# Patient Record
Sex: MALE | Race: WHITE | NOT HISPANIC OR LATINO | Employment: FULL TIME | URBAN - METROPOLITAN AREA
[De-identification: names, ages, dates, MRNs, and addresses within clinical notes are randomized per-mention and may not be internally consistent; named-entity substitution may affect disease eponyms.]

---

## 2017-10-09 ENCOUNTER — TELEPHONE (OUTPATIENT)
Dept: INTERNAL MEDICINE | Facility: CLINIC | Age: 27
End: 2017-10-09

## 2017-10-09 NOTE — TELEPHONE ENCOUNTER
Spoke to patient, offered appointment for Wednesday (10/11/17). Patient states he will call back to schedule once he speaks to his spouse.

## 2017-10-09 NOTE — TELEPHONE ENCOUNTER
----- Message from Renetta Stevenson sent at 10/9/2017  8:32 AM CDT -----  Contact: self  Nathan Huff  MRN: [id number]  : 1990  PCP: Shanelle Pan  Home Phone      [phone]  Work Phone      Not on file.  Mobile          Not on file.      MESSAGE:    Use to see trina needs to change pcp, has been having some left arm/leg numbing and its not going away would like to speak to nurse    Phone:  [phone]

## 2020-08-17 ENCOUNTER — TELEPHONE (OUTPATIENT)
Dept: FAMILY MEDICINE | Facility: CLINIC | Age: 30
End: 2020-08-17

## 2020-08-17 NOTE — TELEPHONE ENCOUNTER
----- Message from Yelitza Ortega sent at 2020  8:48 AM CDT -----  Regarding: Est care  Contact: spouse- Tiffani Huff  MRN: [id number]  : 1990  PCP: Shanelle Pan  Home Phone      [phone]  Work Phone      Not on file.  Mobile          Not on file.      MESSAGE:   Wife would like the patient would like to the patient to est care with dr. Thorne, states he has having anxiety and depression.   [employer] insurance please advise    Phone:  [phone]

## 2020-08-17 NOTE — TELEPHONE ENCOUNTER
Spoke with Tiffani--requesting to be seen as a new pt by Dr Thorne or Dr Sharpe. Advised her that neither one is taking new pts at this time, but I could set him up with another provider or I could send a message to AD. She declined and said don't worry about it.

## 2021-05-04 ENCOUNTER — PATIENT MESSAGE (OUTPATIENT)
Dept: RESEARCH | Facility: HOSPITAL | Age: 31
End: 2021-05-04

## 2022-10-25 ENCOUNTER — HOSPITAL ENCOUNTER (EMERGENCY)
Facility: HOSPITAL | Age: 32
Discharge: HOME | End: 2022-10-25
Attending: SURGERY
Payer: COMMERCIAL

## 2022-10-25 VITALS
RESPIRATION RATE: 18 BRPM | HEART RATE: 81 BPM | BODY MASS INDEX: 30.7 KG/M2 | TEMPERATURE: 98 F | HEIGHT: 72 IN | SYSTOLIC BLOOD PRESSURE: 128 MMHG | WEIGHT: 226.63 LBS | OXYGEN SATURATION: 95 % | DIASTOLIC BLOOD PRESSURE: 85 MMHG

## 2022-10-25 DIAGNOSIS — M25.512 ACUTE PAIN OF LEFT SHOULDER: ICD-10-CM
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
## 2022-10-25 PROCEDURE — 99284 EMERGENCY DEPT VISIT MOD MDM: CPT

## 2022-10-25 RX ORDER — CYCLOBENZAPRINE HCL 10 MG
10 TABLET ORAL 3 TIMES DAILY PRN
Qty: 10 TABLET | Refills: 0 | Status: SHIPPED | OUTPATIENT
Start: 2022-10-25 | End: 2022-10-30

## 2022-10-25 RX ORDER — IBUPROFEN 800 MG/1
800 TABLET ORAL EVERY 6 HOURS PRN
Qty: 20 TABLET | Refills: 0 | Status: SHIPPED | OUTPATIENT
Start: 2022-10-25 | End: 2024-02-01

## 2022-10-25 RX ORDER — PANTOPRAZOLE SODIUM 40 MG/1
40 TABLET, DELAYED RELEASE ORAL DAILY
Qty: 30 TABLET | Refills: 0 | Status: SHIPPED | OUTPATIENT
Start: 2022-10-25 | End: 2024-02-01
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
## 2022-11-11 ENCOUNTER — OFFICE VISIT (OUTPATIENT)
Dept: FAMILY MEDICINE | Facility: CLINIC | Age: 32
End: 2022-11-11
Payer: COMMERCIAL

## 2022-11-11 VITALS
HEIGHT: 72 IN | SYSTOLIC BLOOD PRESSURE: 124 MMHG | WEIGHT: 227.19 LBS | RESPIRATION RATE: 14 BRPM | BODY MASS INDEX: 30.77 KG/M2 | OXYGEN SATURATION: 97 % | DIASTOLIC BLOOD PRESSURE: 88 MMHG | HEART RATE: 69 BPM

## 2022-11-11 DIAGNOSIS — G47.00 INSOMNIA, UNSPECIFIED TYPE: ICD-10-CM
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
## 2022-11-11 PROCEDURE — 3074F PR MOST RECENT SYSTOLIC BLOOD PRESSURE < 130 MM HG: ICD-10-PCS | Mod: CPTII,S$GLB,, | Performed by: FAMILY MEDICINE

## 2022-11-11 PROCEDURE — 99999 PR PBB SHADOW E&M-EST. PATIENT-LVL III: CPT | Mod: PBBFAC,,, | Performed by: FAMILY MEDICINE

## 2022-11-11 PROCEDURE — 99203 PR OFFICE/OUTPT VISIT, NEW, LEVL III, 30-44 MIN: ICD-10-PCS | Mod: S$GLB,,, | Performed by: FAMILY MEDICINE

## 2022-11-11 PROCEDURE — 1160F RVW MEDS BY RX/DR IN RCRD: CPT | Mod: CPTII,S$GLB,, | Performed by: FAMILY MEDICINE

## 2022-11-11 PROCEDURE — 3079F DIAST BP 80-89 MM HG: CPT | Mod: CPTII,S$GLB,, | Performed by: FAMILY MEDICINE

## 2022-11-11 PROCEDURE — 3074F SYST BP LT 130 MM HG: CPT | Mod: CPTII,S$GLB,, | Performed by: FAMILY MEDICINE

## 2022-11-11 PROCEDURE — 99999 PR PBB SHADOW E&M-EST. PATIENT-LVL III: ICD-10-PCS | Mod: PBBFAC,,, | Performed by: FAMILY MEDICINE

## 2022-11-11 PROCEDURE — 1159F PR MEDICATION LIST DOCUMENTED IN MEDICAL RECORD: ICD-10-PCS | Mod: CPTII,S$GLB,, | Performed by: FAMILY MEDICINE

## 2022-11-11 PROCEDURE — 3008F PR BODY MASS INDEX (BMI) DOCUMENTED: ICD-10-PCS | Mod: CPTII,S$GLB,, | Performed by: FAMILY MEDICINE

## 2022-11-11 PROCEDURE — 99203 OFFICE O/P NEW LOW 30 MIN: CPT | Mod: S$GLB,,, | Performed by: FAMILY MEDICINE

## 2022-11-11 PROCEDURE — 3079F PR MOST RECENT DIASTOLIC BLOOD PRESSURE 80-89 MM HG: ICD-10-PCS | Mod: CPTII,S$GLB,, | Performed by: FAMILY MEDICINE

## 2022-11-11 PROCEDURE — 1159F MED LIST DOCD IN RCRD: CPT | Mod: CPTII,S$GLB,, | Performed by: FAMILY MEDICINE

## 2022-11-11 PROCEDURE — 3008F BODY MASS INDEX DOCD: CPT | Mod: CPTII,S$GLB,, | Performed by: FAMILY MEDICINE

## 2022-11-11 PROCEDURE — 1160F PR REVIEW ALL MEDS BY PRESCRIBER/CLIN PHARMACIST DOCUMENTED: ICD-10-PCS | Mod: CPTII,S$GLB,, | Performed by: FAMILY MEDICINE

## 2022-11-11 RX ORDER — TRAZODONE HYDROCHLORIDE 50 MG/1
50 TABLET ORAL NIGHTLY
Qty: 30 TABLET | Refills: 2 | Status: SHIPPED | OUTPATIENT
Start: 2022-11-11 | End: 2024-02-01
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
## 2022-12-05 ENCOUNTER — LAB VISIT (OUTPATIENT)
Dept: FAMILY MEDICINE | Facility: CLINIC | Age: 32
End: 2022-12-05
Payer: COMMERCIAL

## 2022-12-05 ENCOUNTER — OFFICE VISIT (OUTPATIENT)
Dept: FAMILY MEDICINE | Facility: CLINIC | Age: 32
End: 2022-12-05
Payer: COMMERCIAL

## 2022-12-05 VITALS
HEART RATE: 60 BPM | WEIGHT: 225.44 LBS | HEIGHT: 72 IN | OXYGEN SATURATION: 96 % | RESPIRATION RATE: 14 BRPM | DIASTOLIC BLOOD PRESSURE: 78 MMHG | BODY MASS INDEX: 30.54 KG/M2 | SYSTOLIC BLOOD PRESSURE: 120 MMHG

## 2022-12-05 DIAGNOSIS — G47.00 INSOMNIA, UNSPECIFIED TYPE: ICD-10-CM

## 2022-12-05 DIAGNOSIS — Z00.00 PREVENTATIVE HEALTH CARE: ICD-10-CM

## 2022-12-05 DIAGNOSIS — Z00.00 PREVENTATIVE HEALTH CARE: Primary | ICD-10-CM

## 2022-12-05 LAB
ALBUMIN SERPL BCP-MCNC: 4.4 G/DL (ref 3.5–5.2)
ALP SERPL-CCNC: 82 U/L (ref 55–135)
ALT SERPL W/O P-5'-P-CCNC: 21 U/L (ref 10–44)
ANION GAP SERPL CALC-SCNC: 10 MMOL/L (ref 8–16)
AST SERPL-CCNC: 18 U/L (ref 10–40)
BASOPHILS # BLD AUTO: 0.05 K/UL (ref 0–0.2)
BASOPHILS NFR BLD: 0.8 % (ref 0–1.9)
BILIRUB SERPL-MCNC: 1.9 MG/DL (ref 0.1–1)
BUN SERPL-MCNC: 9 MG/DL (ref 6–20)
CALCIUM SERPL-MCNC: 9.9 MG/DL (ref 8.7–10.5)
CHLORIDE SERPL-SCNC: 105 MMOL/L (ref 95–110)
CHOLEST SERPL-MCNC: 236 MG/DL (ref 120–199)
CHOLEST/HDLC SERPL: 7.4 {RATIO} (ref 2–5)
CO2 SERPL-SCNC: 26 MMOL/L (ref 23–29)
CREAT SERPL-MCNC: 0.8 MG/DL (ref 0.5–1.4)
DIFFERENTIAL METHOD: NORMAL
EOSINOPHIL # BLD AUTO: 0.3 K/UL (ref 0–0.5)
EOSINOPHIL NFR BLD: 4.3 % (ref 0–8)
ERYTHROCYTE [DISTWIDTH] IN BLOOD BY AUTOMATED COUNT: 12.3 % (ref 11.5–14.5)
EST. GFR  (NO RACE VARIABLE): >60 ML/MIN/1.73 M^2
GLUCOSE SERPL-MCNC: 87 MG/DL (ref 70–110)
HCT VFR BLD AUTO: 46.1 % (ref 40–54)
HDLC SERPL-MCNC: 32 MG/DL (ref 40–75)
HDLC SERPL: 13.6 % (ref 20–50)
HGB BLD-MCNC: 15.7 G/DL (ref 14–18)
IMM GRANULOCYTES # BLD AUTO: 0.02 K/UL (ref 0–0.04)
IMM GRANULOCYTES NFR BLD AUTO: 0.3 % (ref 0–0.5)
LDLC SERPL CALC-MCNC: 171.2 MG/DL (ref 63–159)
LYMPHOCYTES # BLD AUTO: 2.6 K/UL (ref 1–4.8)
LYMPHOCYTES NFR BLD: 41.9 % (ref 18–48)
MCH RBC QN AUTO: 30 PG (ref 27–31)
MCHC RBC AUTO-ENTMCNC: 34.1 G/DL (ref 32–36)
MCV RBC AUTO: 88 FL (ref 82–98)
MONOCYTES # BLD AUTO: 0.4 K/UL (ref 0.3–1)
MONOCYTES NFR BLD: 5.8 % (ref 4–15)
NEUTROPHILS # BLD AUTO: 2.9 K/UL (ref 1.8–7.7)
NEUTROPHILS NFR BLD: 46.9 % (ref 38–73)
NONHDLC SERPL-MCNC: 204 MG/DL
NRBC BLD-RTO: 0 /100 WBC
PLATELET # BLD AUTO: 250 K/UL (ref 150–450)
PMV BLD AUTO: 11.3 FL (ref 9.2–12.9)
POTASSIUM SERPL-SCNC: 5 MMOL/L (ref 3.5–5.1)
PROT SERPL-MCNC: 7.4 G/DL (ref 6–8.4)
RBC # BLD AUTO: 5.24 M/UL (ref 4.6–6.2)
SODIUM SERPL-SCNC: 141 MMOL/L (ref 136–145)
TRIGL SERPL-MCNC: 164 MG/DL (ref 30–150)
TSH SERPL DL<=0.005 MIU/L-ACNC: 2.05 UIU/ML (ref 0.4–4)
WBC # BLD AUTO: 6.08 K/UL (ref 3.9–12.7)

## 2022-12-05 PROCEDURE — 99999 PR PBB SHADOW E&M-EST. PATIENT-LVL III: ICD-10-PCS | Mod: PBBFAC,,, | Performed by: FAMILY MEDICINE

## 2022-12-05 PROCEDURE — 3008F PR BODY MASS INDEX (BMI) DOCUMENTED: ICD-10-PCS | Mod: CPTII,S$GLB,, | Performed by: FAMILY MEDICINE

## 2022-12-05 PROCEDURE — 36415 COLL VENOUS BLD VENIPUNCTURE: CPT | Mod: S$GLB,,, | Performed by: FAMILY MEDICINE

## 2022-12-05 PROCEDURE — 85025 COMPLETE CBC W/AUTO DIFF WBC: CPT | Performed by: FAMILY MEDICINE

## 2022-12-05 PROCEDURE — 99395 PR PREVENTIVE VISIT,EST,18-39: ICD-10-PCS | Mod: S$GLB,,, | Performed by: FAMILY MEDICINE

## 2022-12-05 PROCEDURE — 3008F BODY MASS INDEX DOCD: CPT | Mod: CPTII,S$GLB,, | Performed by: FAMILY MEDICINE

## 2022-12-05 PROCEDURE — 83036 HEMOGLOBIN GLYCOSYLATED A1C: CPT | Performed by: FAMILY MEDICINE

## 2022-12-05 PROCEDURE — 80061 LIPID PANEL: CPT | Performed by: FAMILY MEDICINE

## 2022-12-05 PROCEDURE — 36415 PR COLLECTION VENOUS BLOOD,VENIPUNCTURE: ICD-10-PCS | Mod: S$GLB,,, | Performed by: FAMILY MEDICINE

## 2022-12-05 PROCEDURE — 99395 PREV VISIT EST AGE 18-39: CPT | Mod: S$GLB,,, | Performed by: FAMILY MEDICINE

## 2022-12-05 PROCEDURE — 1159F PR MEDICATION LIST DOCUMENTED IN MEDICAL RECORD: ICD-10-PCS | Mod: CPTII,S$GLB,, | Performed by: FAMILY MEDICINE

## 2022-12-05 PROCEDURE — 84443 ASSAY THYROID STIM HORMONE: CPT | Performed by: FAMILY MEDICINE

## 2022-12-05 PROCEDURE — 99999 PR PBB SHADOW E&M-EST. PATIENT-LVL III: CPT | Mod: PBBFAC,,, | Performed by: FAMILY MEDICINE

## 2022-12-05 PROCEDURE — 80053 COMPREHEN METABOLIC PANEL: CPT | Performed by: FAMILY MEDICINE

## 2022-12-05 PROCEDURE — 1159F MED LIST DOCD IN RCRD: CPT | Mod: CPTII,S$GLB,, | Performed by: FAMILY MEDICINE
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
## 2022-12-06 LAB
ESTIMATED AVG GLUCOSE: 97 MG/DL (ref 68–131)
HBA1C MFR BLD: 5 % (ref 4–5.6)

## 2022-12-06 NOTE — PROGRESS NOTES
Test results normal   Benzoyl Peroxide Counseling: Patient counseled that medicine may cause skin irritation and bleach clothing.  In the event of skin irritation, the patient was advised to reduce the amount of the drug applied or use it less frequently.   The patient verbalized understanding of the proper use and possible adverse effects of benzoyl peroxide.  All of the patient's questions and concerns were addressed.

## 2022-12-06 NOTE — PROGRESS NOTES
Labs look good   His cholesterol is elevated, but for his age he it is fine. As he gets older he may end up on cholesterol medication....   For now lay off the red meat, fried foods, cheeses.

## 2023-04-20 ENCOUNTER — HOSPITAL ENCOUNTER (OUTPATIENT)
Dept: RADIOLOGY | Facility: HOSPITAL | Age: 33
Discharge: HOME | End: 2023-04-20
Attending: CHIROPRACTOR
Payer: COMMERCIAL

## 2023-04-20 DIAGNOSIS — M51.16 RADICULOPATHY DUE TO LUMBAR INTERVERTEBRAL DISC DISORDER: ICD-10-CM

## 2023-04-20 PROCEDURE — 72148 MRI LUMBAR SPINE W/O DYE: CPT | Mod: 26,,, | Performed by: RADIOLOGY

## 2023-04-20 PROCEDURE — 72148 MRI LUMBAR SPINE WITHOUT CONTRAST: ICD-10-PCS | Mod: 26,,, | Performed by: RADIOLOGY

## 2023-04-20 PROCEDURE — 72148 MRI LUMBAR SPINE W/O DYE: CPT | Mod: TC

## 2024-02-01 ENCOUNTER — OFFICE VISIT (OUTPATIENT)
Dept: FAMILY MEDICINE | Facility: CLINIC | Age: 34
End: 2024-02-01
Payer: COMMERCIAL

## 2024-02-01 VITALS
OXYGEN SATURATION: 97 % | RESPIRATION RATE: 18 BRPM | BODY MASS INDEX: 31.4 KG/M2 | DIASTOLIC BLOOD PRESSURE: 74 MMHG | HEART RATE: 60 BPM | HEIGHT: 72 IN | SYSTOLIC BLOOD PRESSURE: 118 MMHG | WEIGHT: 231.81 LBS

## 2024-02-01 DIAGNOSIS — Z00.00 PREVENTATIVE HEALTH CARE: Primary | ICD-10-CM

## 2024-02-01 DIAGNOSIS — G43.109 MIGRAINE WITH AURA AND WITHOUT STATUS MIGRAINOSUS, NOT INTRACTABLE: ICD-10-CM

## 2024-02-01 DIAGNOSIS — M51.36 BULGE OF LUMBAR DISC WITHOUT MYELOPATHY: ICD-10-CM

## 2024-02-01 PROBLEM — M51.369 BULGE OF LUMBAR DISC WITHOUT MYELOPATHY: Status: ACTIVE | Noted: 2024-02-01

## 2024-02-01 PROCEDURE — 3008F BODY MASS INDEX DOCD: CPT | Mod: CPTII,S$GLB,, | Performed by: FAMILY MEDICINE

## 2024-02-01 PROCEDURE — 3078F DIAST BP <80 MM HG: CPT | Mod: CPTII,S$GLB,, | Performed by: FAMILY MEDICINE

## 2024-02-01 PROCEDURE — 3074F SYST BP LT 130 MM HG: CPT | Mod: CPTII,S$GLB,, | Performed by: FAMILY MEDICINE

## 2024-02-01 PROCEDURE — 99999 PR PBB SHADOW E&M-EST. PATIENT-LVL III: CPT | Mod: PBBFAC,,, | Performed by: FAMILY MEDICINE

## 2024-02-01 PROCEDURE — 1159F MED LIST DOCD IN RCRD: CPT | Mod: CPTII,S$GLB,, | Performed by: FAMILY MEDICINE

## 2024-02-01 PROCEDURE — 1160F RVW MEDS BY RX/DR IN RCRD: CPT | Mod: CPTII,S$GLB,, | Performed by: FAMILY MEDICINE

## 2024-02-01 PROCEDURE — 99395 PREV VISIT EST AGE 18-39: CPT | Mod: S$GLB,,, | Performed by: FAMILY MEDICINE

## 2024-02-01 NOTE — PROGRESS NOTES
Subjective:       Patient ID: Nathan Huff is a 33 y.o. male.    Chief Complaint: Annual Exam (PT HERE FOR ANNUAL EXAM. )    Pt is a 33 y.o. male who presents for evaluation and management of   Encounter Diagnoses   Name Primary?    Preventative health care Yes    Bulge of lumbar disc without myelopathy     Migraine with aura and without status migrainosus, not intractable    .  Doing well on current meds. Denies any side effects. Prevention is up to date.    Review of Systems   Constitutional:  Negative for activity change and unexpected weight change.   HENT:  Negative for hearing loss, rhinorrhea and trouble swallowing.    Eyes:  Positive for visual disturbance. Negative for discharge.   Respiratory:  Negative for chest tightness and wheezing.    Cardiovascular:  Negative for chest pain and palpitations.   Gastrointestinal:  Negative for blood in stool, constipation, diarrhea and vomiting.   Endocrine: Negative for polydipsia and polyuria.   Genitourinary:  Negative for difficulty urinating, hematuria and urgency.   Musculoskeletal:  Negative for arthralgias, joint swelling and neck pain.   Neurological:  Positive for headaches. Negative for weakness.   Psychiatric/Behavioral:  Negative for confusion and dysphoric mood.        Objective:      Physical Exam  Constitutional:       Appearance: Normal appearance. He is well-developed. He is not ill-appearing.   HENT:      Head: Normocephalic and atraumatic.      Right Ear: External ear normal.      Left Ear: External ear normal.      Nose: Nose normal.      Mouth/Throat:      Mouth: Mucous membranes are moist.      Pharynx: No oropharyngeal exudate or posterior oropharyngeal erythema.   Eyes:      General: No scleral icterus.        Right eye: No discharge.         Left eye: No discharge.      Conjunctiva/sclera: Conjunctivae normal.      Pupils: Pupils are equal, round, and reactive to light.   Neck:      Thyroid: No thyromegaly.      Vascular: No JVD.      Trachea:  No tracheal deviation.   Cardiovascular:      Rate and Rhythm: Normal rate and regular rhythm.      Heart sounds: Normal heart sounds. No murmur heard.  Pulmonary:      Effort: Pulmonary effort is normal. No respiratory distress.      Breath sounds: Normal breath sounds. No wheezing or rales.   Chest:      Chest wall: No tenderness.   Abdominal:      General: Bowel sounds are normal. There is no distension.      Palpations: Abdomen is soft. There is no mass.      Tenderness: There is no abdominal tenderness. There is no guarding or rebound.   Musculoskeletal:         General: Normal range of motion.      Cervical back: Normal range of motion and neck supple.      Right lower leg: No edema.      Left lower leg: No edema.   Lymphadenopathy:      Cervical: No cervical adenopathy.   Skin:     General: Skin is warm and dry.   Neurological:      Mental Status: He is alert and oriented to person, place, and time.      Cranial Nerves: No cranial nerve deficit.      Motor: No abnormal muscle tone.      Coordination: Coordination normal.      Deep Tendon Reflexes: Reflexes are normal and symmetric. Reflexes normal.   Psychiatric:         Behavior: Behavior normal.         Thought Content: Thought content normal.         Judgment: Judgment normal.         Assessment:       1. Preventative health care    2. Bulge of lumbar disc without myelopathy    3. Migraine with aura and without status migrainosus, not intractable        Plan:   1. Preventative health care  -     CBC Auto Differential; Future; Expected date: 02/02/2024  -     Comprehensive Metabolic Panel; Future; Expected date: 02/02/2024  -     Lipid Panel; Future; Expected date: 02/02/2024  -     TSH; Future; Expected date: 02/02/2024    2. Bulge of lumbar disc without myelopathy  Overview:  MRI 2023 with L4/L5/S1 disc bulges. Mild neuro-foraminal narrowing. Pt has no radicular symptoms         3. Migraine with aura and without status migrainosus, not  intractable  Overview:  Had one aura without h/a. A week later he had another aura with h/a following. NO other auras or h/a.   Will observe for now. No meds         No follow-ups on file.

## 2024-02-02 ENCOUNTER — LAB VISIT (OUTPATIENT)
Dept: LAB | Facility: HOSPITAL | Age: 34
End: 2024-02-02
Attending: FAMILY MEDICINE
Payer: COMMERCIAL

## 2024-02-02 DIAGNOSIS — Z00.00 PREVENTATIVE HEALTH CARE: ICD-10-CM

## 2024-02-02 LAB
ALBUMIN SERPL BCP-MCNC: 4.3 G/DL (ref 3.5–5.2)
ALP SERPL-CCNC: 78 U/L (ref 55–135)
ALT SERPL W/O P-5'-P-CCNC: 21 U/L (ref 10–44)
ANION GAP SERPL CALC-SCNC: 8 MMOL/L (ref 8–16)
AST SERPL-CCNC: 17 U/L (ref 10–40)
BASOPHILS # BLD AUTO: 0.05 K/UL (ref 0–0.2)
BASOPHILS NFR BLD: 0.8 % (ref 0–1.9)
BILIRUB SERPL-MCNC: 1 MG/DL (ref 0.1–1)
BUN SERPL-MCNC: 10 MG/DL (ref 6–20)
CALCIUM SERPL-MCNC: 9.6 MG/DL (ref 8.7–10.5)
CHLORIDE SERPL-SCNC: 106 MMOL/L (ref 95–110)
CHOLEST SERPL-MCNC: 217 MG/DL (ref 120–199)
CHOLEST/HDLC SERPL: 6.4 {RATIO} (ref 2–5)
CO2 SERPL-SCNC: 28 MMOL/L (ref 23–29)
CREAT SERPL-MCNC: 0.9 MG/DL (ref 0.5–1.4)
DIFFERENTIAL METHOD BLD: NORMAL
EOSINOPHIL # BLD AUTO: 0.3 K/UL (ref 0–0.5)
EOSINOPHIL NFR BLD: 4.4 % (ref 0–8)
ERYTHROCYTE [DISTWIDTH] IN BLOOD BY AUTOMATED COUNT: 12.2 % (ref 11.5–14.5)
EST. GFR  (NO RACE VARIABLE): >60 ML/MIN/1.73 M^2
GLUCOSE SERPL-MCNC: 97 MG/DL (ref 70–110)
HCT VFR BLD AUTO: 47.1 % (ref 40–54)
HDLC SERPL-MCNC: 34 MG/DL (ref 40–75)
HDLC SERPL: 15.7 % (ref 20–50)
HGB BLD-MCNC: 15.8 G/DL (ref 14–18)
IMM GRANULOCYTES # BLD AUTO: 0.02 K/UL (ref 0–0.04)
IMM GRANULOCYTES NFR BLD AUTO: 0.3 % (ref 0–0.5)
LDLC SERPL CALC-MCNC: 143.6 MG/DL (ref 63–159)
LYMPHOCYTES # BLD AUTO: 2.5 K/UL (ref 1–4.8)
LYMPHOCYTES NFR BLD: 39.1 % (ref 18–48)
MCH RBC QN AUTO: 30.1 PG (ref 27–31)
MCHC RBC AUTO-ENTMCNC: 33.5 G/DL (ref 32–36)
MCV RBC AUTO: 90 FL (ref 82–98)
MONOCYTES # BLD AUTO: 0.4 K/UL (ref 0.3–1)
MONOCYTES NFR BLD: 6 % (ref 4–15)
NEUTROPHILS # BLD AUTO: 3.1 K/UL (ref 1.8–7.7)
NEUTROPHILS NFR BLD: 49.4 % (ref 38–73)
NONHDLC SERPL-MCNC: 183 MG/DL
NRBC BLD-RTO: 0 /100 WBC
PLATELET # BLD AUTO: 247 K/UL (ref 150–450)
PMV BLD AUTO: 10.7 FL (ref 9.2–12.9)
POTASSIUM SERPL-SCNC: 4.5 MMOL/L (ref 3.5–5.1)
PROT SERPL-MCNC: 7.4 G/DL (ref 6–8.4)
RBC # BLD AUTO: 5.25 M/UL (ref 4.6–6.2)
SODIUM SERPL-SCNC: 142 MMOL/L (ref 136–145)
TRIGL SERPL-MCNC: 197 MG/DL (ref 30–150)
TSH SERPL DL<=0.005 MIU/L-ACNC: 1.36 UIU/ML (ref 0.4–4)
WBC # BLD AUTO: 6.35 K/UL (ref 3.9–12.7)

## 2024-02-02 PROCEDURE — 84443 ASSAY THYROID STIM HORMONE: CPT | Performed by: FAMILY MEDICINE

## 2024-02-02 PROCEDURE — 36415 COLL VENOUS BLD VENIPUNCTURE: CPT | Performed by: FAMILY MEDICINE

## 2024-02-02 PROCEDURE — 80053 COMPREHEN METABOLIC PANEL: CPT | Performed by: FAMILY MEDICINE

## 2024-02-02 PROCEDURE — 80061 LIPID PANEL: CPT | Performed by: FAMILY MEDICINE

## 2024-02-02 PROCEDURE — 85025 COMPLETE CBC W/AUTO DIFF WBC: CPT | Performed by: FAMILY MEDICINE

## 2024-02-04 NOTE — PROGRESS NOTES
Labs look good with the exception of a borderline cholesterol. Tell him to eat less red meat. More fish, veggies, and nuts. Nuts will get his good cholesterol up higher where it needs to be. No cholesterol meds are needed yet. Thanks

## 2025-04-07 ENCOUNTER — HOSPITAL ENCOUNTER (EMERGENCY)
Facility: HOSPITAL | Age: 35
Discharge: HOME | End: 2025-04-07
Attending: EMERGENCY MEDICINE
Payer: COMMERCIAL

## 2025-04-07 VITALS
SYSTOLIC BLOOD PRESSURE: 158 MMHG | TEMPERATURE: 99 F | OXYGEN SATURATION: 99 % | HEART RATE: 100 BPM | DIASTOLIC BLOOD PRESSURE: 85 MMHG | RESPIRATION RATE: 18 BRPM | BODY MASS INDEX: 30.48 KG/M2 | WEIGHT: 225 LBS | HEIGHT: 72 IN

## 2025-04-07 DIAGNOSIS — A35 TETANUS: ICD-10-CM

## 2025-04-07 DIAGNOSIS — S68.119A TRAUMATIC AMPUTATION OF FINGER, INITIAL ENCOUNTER: Primary | ICD-10-CM

## 2025-04-07 PROBLEM — S68.113A TRAUMATIC AMPUTATION OF LEFT MIDDLE FINGER: Status: ACTIVE | Noted: 2025-04-07

## 2025-04-07 PROCEDURE — 63600175 PHARM REV CODE 636 W HCPCS: Performed by: EMERGENCY MEDICINE

## 2025-04-07 PROCEDURE — 29125 APPL SHORT ARM SPLINT STATIC: CPT | Mod: LT

## 2025-04-07 PROCEDURE — 99284 EMERGENCY DEPT VISIT MOD MDM: CPT | Mod: 25

## 2025-04-07 PROCEDURE — 25000003 PHARM REV CODE 250

## 2025-04-07 PROCEDURE — 63600175 PHARM REV CODE 636 W HCPCS: Performed by: STUDENT IN AN ORGANIZED HEALTH CARE EDUCATION/TRAINING PROGRAM

## 2025-04-07 PROCEDURE — 90471 IMMUNIZATION ADMIN: CPT | Performed by: EMERGENCY MEDICINE

## 2025-04-07 PROCEDURE — 96374 THER/PROPH/DIAG INJ IV PUSH: CPT

## 2025-04-07 PROCEDURE — 96375 TX/PRO/DX INJ NEW DRUG ADDON: CPT

## 2025-04-07 PROCEDURE — 90715 TDAP VACCINE 7 YRS/> IM: CPT | Performed by: EMERGENCY MEDICINE

## 2025-04-07 RX ORDER — ONDANSETRON HYDROCHLORIDE 2 MG/ML
4 INJECTION, SOLUTION INTRAVENOUS
Status: DISCONTINUED | OUTPATIENT
Start: 2025-04-07 | End: 2025-04-07

## 2025-04-07 RX ORDER — CEFAZOLIN 2 G/1
2 INJECTION, POWDER, FOR SOLUTION INTRAMUSCULAR; INTRAVENOUS ONCE
Status: COMPLETED | OUTPATIENT
Start: 2025-04-07 | End: 2025-04-07

## 2025-04-07 RX ORDER — SULFAMETHOXAZOLE AND TRIMETHOPRIM 800; 160 MG/1; MG/1
1 TABLET ORAL 2 TIMES DAILY
Qty: 14 TABLET | Refills: 0 | Status: SHIPPED | OUTPATIENT
Start: 2025-04-07 | End: 2025-04-14

## 2025-04-07 RX ORDER — CEPHALEXIN 500 MG/1
500 CAPSULE ORAL 3 TIMES DAILY
Qty: 21 CAPSULE | Refills: 0 | Status: SHIPPED | OUTPATIENT
Start: 2025-04-07 | End: 2025-04-07 | Stop reason: ALTCHOICE

## 2025-04-07 RX ORDER — CEFAZOLIN SODIUM 1 G/3ML
2 INJECTION, POWDER, FOR SOLUTION INTRAMUSCULAR; INTRAVENOUS
Status: DISCONTINUED | OUTPATIENT
Start: 2025-04-07 | End: 2025-04-07

## 2025-04-07 RX ORDER — OXYCODONE HYDROCHLORIDE 5 MG/1
5 TABLET ORAL EVERY 4 HOURS PRN
Qty: 12 TABLET | Refills: 0 | Status: SHIPPED | OUTPATIENT
Start: 2025-04-07

## 2025-04-07 RX ORDER — ONDANSETRON HYDROCHLORIDE 2 MG/ML
4 INJECTION, SOLUTION INTRAVENOUS EVERY 6 HOURS PRN
Status: DISCONTINUED | OUTPATIENT
Start: 2025-04-07 | End: 2025-04-07 | Stop reason: HOSPADM

## 2025-04-07 RX ORDER — MORPHINE SULFATE 2 MG/ML
6 INJECTION, SOLUTION INTRAMUSCULAR; INTRAVENOUS
Status: COMPLETED | OUTPATIENT
Start: 2025-04-07 | End: 2025-04-07

## 2025-04-07 RX ORDER — OXYCODONE HYDROCHLORIDE 5 MG/1
5 TABLET ORAL ONCE
Refills: 0 | Status: COMPLETED | OUTPATIENT
Start: 2025-04-07 | End: 2025-04-07

## 2025-04-07 RX ADMIN — CEFAZOLIN 2 G: 2 INJECTION, POWDER, FOR SOLUTION INTRAMUSCULAR; INTRAVENOUS at 06:04

## 2025-04-07 RX ADMIN — ONDANSETRON 4 MG: 2 INJECTION INTRAMUSCULAR; INTRAVENOUS at 07:04

## 2025-04-07 RX ADMIN — CLOSTRIDIUM TETANI TOXOID ANTIGEN (FORMALDEHYDE INACTIVATED), CORYNEBACTERIUM DIPHTHERIAE TOXOID ANTIGEN (FORMALDEHYDE INACTIVATED), BORDETELLA PERTUSSIS TOXOID ANTIGEN (GLUTARALDEHYDE INACTIVATED), BORDETELLA PERTUSSIS FILAMENTOUS HEMAGGLUTININ ANTIGEN (FORMALDEHYDE INACTIVATED), BORDETELLA PERTUSSIS PERTACTIN ANTIGEN, AND BORDETELLA PERTUSSIS FIMBRIAE 2/3 ANTIGEN 0.5 ML: 5; 2; 2.5; 5; 3; 5 INJECTION, SUSPENSION INTRAMUSCULAR at 06:04

## 2025-04-07 RX ADMIN — MORPHINE SULFATE 6 MG: 2 INJECTION, SOLUTION INTRAMUSCULAR; INTRAVENOUS at 06:04

## 2025-04-07 RX ADMIN — OXYCODONE 5 MG: 5 TABLET ORAL at 07:04

## 2025-04-07 NOTE — ED TRIAGE NOTES
Nathan Huff, a 35 y.o. male presents to the ED w/ complaint of left middle finger upper amputation. Pt reports having finger stuck in saw. Arrived to ED with finger piece on ice. Aaox4.

## 2025-04-07 NOTE — ED PROVIDER NOTES
Encounter Date: 4/7/2025       History     Chief Complaint   Patient presents with    Laceration     L middle finger partial amputation, arrived with a portion of finger on ice     HPI    35-year-old male with history of migraine, presenting for left middle finger partial amputation.      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  He looked away and accidentally sliced the tip of his 3rd left finger.  He immediately put compression on, there was initially bleeding but bleeding was controlled with direct pressure.  He reports 10/10 pain in the left hand.  He denies numbness or tingling in the remaining segment.  He denies distal weakness in the fingertip.  He also brought the skin tissue with him to the emergency department that has cut off.  He denies nausea, vomiting, fever, chills, chest pain, shortness of breath.  He denies allergies to medications.    Review of patient's allergies indicates:   Allergen Reactions    Nickel sutures [surgical stainless steel] Rash     Past Medical History:   Diagnosis Date    Psoriasiform dermatitis      Past Surgical History:   Procedure Laterality Date    MOUTH SURGERY       Family History   Problem Relation Name Age of Onset    Heart disease Father      Hyperlipidemia Father      Diabetes Father       Social History[1]  Review of Systems  See HPI for pertinent ROS.   Physical Exam     Initial Vitals [04/07/25 1736]   BP Pulse Resp Temp SpO2   (!) 158/85 100 20 98.5 °F (36.9 °C) 99 %      MAP       --         Physical Exam    Constitutional: He appears well-developed and well-nourished.   HENT:   Head: Normocephalic and atraumatic.   Eyes: Pupils are equal, round, and reactive to light. No scleral icterus.   Neck: No JVD present.   Normal range of motion.  Cardiovascular:  Normal rate and regular rhythm.     Exam reveals no gallop and no friction rub.       No murmur heard.  Pulmonary/Chest: Breath sounds normal. No stridor. He has no  wheezes. He has no rhonchi. He has no rales.   Abdominal: Abdomen is soft. There is no abdominal tenderness. There is no rebound and no guarding.   Musculoskeletal:         General: Tenderness present. No edema.      Cervical back: Normal range of motion.      Comments: Partial amputation to the lateral distal phalynx on the 3rd left digit.  Distal and proximal phalangeal flexion intact.  Sensation intact in the remaining segment, hemodynamically stable.  2+ radial pulse.  No tenderness to palpation of the MCPs, snuffbox, wrist, forearm, elbow.     Neurological: He is alert. GCS score is 15. GCS eye subscore is 4. GCS verbal subscore is 5. GCS motor subscore is 6.   Skin: Skin is warm. Capillary refill takes less than 2 seconds.   Psychiatric: He has a normal mood and affect.                       ED Course   Procedures  Labs Reviewed - No data to display       Imaging Results              X-Ray Hand 3 View Left (Final result)  Result time 04/07/25 19:11:50      Final result by Abdirahman Alvarez MD (04/07/25 19:11:50)                   Impression:      Prominent soft tissue and osseous partial amputation of the distal 3rd finger, as above.      Electronically signed by: Abdirahman Alvarez MD  Date:    04/07/2025  Time:    19:11               Narrative:    EXAMINATION:  XR HAND COMPLETE 3 VIEW LEFT    CLINICAL HISTORY:  finger amputation;.    TECHNIQUE:  PA, lateral, and oblique views of the left hand were performed.    COMPARISON:  None    FINDINGS:  Partial osseous and soft tissue amputation of the 3rd finger tip and distal portion including 3rd distal phalanx distal to the mid shaft and along the radial aspect including the base, radial aspect of the head of the 3rd middle phalanx, and radial aspect of the 3rd DIP.  Small portion of the 3rd distal phalangeal tuft ulnar aspect remains.  Very large soft tissue defect/amputation including the nail bed and fingernail, correlating with the osseous amputation site full-thickness  to the level of the underlying bowel and distal phalanges.    Remaining osseous structures appear well aligned and intact elsewhere.  No radiopaque foreign body identified noting bandage material about the 3rd finger.                                       Medications   ondansetron injection 4 mg (4 mg Intravenous Given 4/7/25 1952)   ceFAZolin 2 g (2 g Intravenous Given 4/7/25 1838)   Tdap vaccine injection 0.5 mL (0.5 mLs Intramuscular Given 4/7/25 1818)   morphine injection 6 mg (6 mg Intravenous Given 4/7/25 1837)   oxyCODONE immediate release tablet 5 mg (5 mg Oral Given 4/7/25 1946)     Medical Decision Making  Risk  Prescription drug management.                                  35-year-old male described as above presenting for finger versus saw with partial finger amputation of the left 3rd distal phalanges.  On arrival, vital signs are stable, he is afebrile and saturating well on room air.  Wound is hemodynamically stable.  He is neurovascularly intact.  Consulted Orthopedics who completed wound washout and placed hand and a volar splint.  He was given IV morphine, IV Ancef, IV Zofran.  Tetanus was also updated and he was also given oral oxycodone for continued pain management.  Ortho recommendations include follow up in clinic with Dr. Chicas, keeping splint clean and dry, and starting oral Bactrim.  Bactrim and p.r.n. oxycodone sent to pharmacy.  He was discharged in stable condition with strict return to ER precautions.      Clinical Impression:  Final diagnoses:  [S68.119A] Traumatic amputation of finger, initial encounter (Primary)  [A35] Tetanus          ED Disposition Condition    Discharge Stable          ED Prescriptions       Medication Sig Dispense Start Date End Date Auth. Provider    cephALEXin (KEFLEX) 500 MG capsule  (Status: Discontinued) Take 1 capsule (500 mg total) by mouth 3 (three) times daily. for 7 days 21 capsule 4/7/2025 4/7/2025 Cheyanne Del Angel MD    oxyCODONE (ROXICODONE) 5 MG  immediate release tablet Take 1 tablet (5 mg total) by mouth every 4 (four) hours as needed for Pain. 12 tablet 4/7/2025 -- Cheyanne Del Angel MD    sulfamethoxazole-trimethoprim 800-160mg (BACTRIM DS) 800-160 mg Tab Take 1 tablet by mouth 2 (two) times daily. for 7 days 14 tablet 4/7/2025 4/14/2025 Cheyanne Del Angel MD          Follow-up Information       Follow up With Specialties Details Why Contact Info    Miguelina Chicas MD Hand Surgery, Orthopedic Surgery Schedule an appointment as soon as possible for a visit   [address]  [facility] [address]  [phone]      [facility] - Emergency Dept Emergency Medicine Go to  If symptoms worsen [address] [address] [phone]                 [1]   Social History  Tobacco Use    Smoking status: Former     Current packs/day: 1.50     Average packs/day: 1.5 packs/day for 2.0 years (3.0 ttl pk-yrs)     Types: Cigarettes     Passive exposure: Past    Smokeless tobacco: Former     Types: Snuff, Chew   Substance Use Topics    Alcohol use: Not Currently     Comment: SOCIAL    Drug use: Never        Cheyanne Del Angel MD  Resident  04/07/25 9844

## 2025-04-07 NOTE — FIRST PROVIDER EVALUATION
Medical screening examination initiated.  I have conducted a focused provider triage encounter, findings are as follows:    Brief history of present illness:  partial finger amputation of left middle finger with shonre saw    There were no vitals filed for this visit.    Pertinent physical exam:  partial amputation    Brief workup plan:  Xray, td, abx, ortho consult    Preliminary workup initiated; this workup will be continued and followed by the physician or advanced practice provider that is assigned to the patient when roomed.

## 2025-04-08 DIAGNOSIS — S68.113A TRAUMATIC AMPUTATION OF LEFT MIDDLE FINGER: Primary | ICD-10-CM

## 2025-04-08 DIAGNOSIS — S66.912A: ICD-10-CM

## 2025-04-08 NOTE — SUBJECTIVE & OBJECTIVE
Past Medical History:   Diagnosis Date    Psoriasiform dermatitis        Past Surgical History:   Procedure Laterality Date    MOUTH SURGERY         Review of patient's allergies indicates:   Allergen Reactions    Nickel sutures [surgical stainless steel] Rash       Current Facility-Administered Medications   Medication    ondansetron injection 4 mg    ondansetron injection 4 mg     No current outpatient medications on file.     Family History       Problem Relation (Age of Onset)    Diabetes Father    Heart disease Father    Hyperlipidemia Father          Tobacco Use    Smoking status: Former     Current packs/day: 1.50     Average packs/day: 1.5 packs/day for 2.0 years (3.0 ttl pk-yrs)     Types: Cigarettes     Passive exposure: Past    Smokeless tobacco: Former     Types: Snuff, Chew   Substance and Sexual Activity    Alcohol use: Not Currently     Comment: SOCIAL    Drug use: Never    Sexual activity: Yes     Partners: Female     ROS  Constitutional: negative for fevers  Eyes: negative visual changes  ENT: negative for hearing loss  Respiratory: negative for dyspnea  Cardiovascular: negative for chest pain  Gastrointestinal: negative for abdominal pain  Genitourinary: negative for dysuria  Neurological: negative for headaches  Behavioral/Psych: negative for hallucinations  Endocrine: negative for temperature intolerance   Objective:     Vital Signs (Most Recent):  Temp: 98.5 °F (36.9 °C) (04/07/25 1736)  Pulse: 100 (04/07/25 1736)  Resp: 18 (04/07/25 1946)  BP: (!) 158/85 (04/07/25 1736)  SpO2: 99 % (04/07/25 1736) Vital Signs (24h Range):  Temp:  [98.5 °F (36.9 °C)] 98.5 °F (36.9 °C)  Pulse:  [100] 100  Resp:  [18-20] 18  SpO2:  [99 %] 99 %  BP: (158)/(85) 158/85     Weight: 102.1 kg (225 lb)  Height: 6' (182.9 cm)  Body mass index is 30.52 kg/m².    No intake or output data in the 24 hours ending 04/07/25 1953     Ortho/SPM Exam  General:  no acute distress, appears stated age   Neuro: alert and oriented  x3  Psych: normal mood  Head: normocephalic, atraumatic.  Eyes: no scleral icterus  Mouth: moist mucous membranes  Cardiovascular: extremities warm and well perfused  Lungs: breathing comfortably, equal chest rise bilat  Skin: clean, dry, intact (any exceptions noted in below musculoskeletal exam)    MSK:  RUE:  - Skin intact throughout, no open wounds  - No swelling  - No ecchymosis, erythema, or signs of cellulitis  - NonTTP throughout  - AROM and PROM of the shoulder, elbow, wrist, and hand intact without pain  - Axillary/AIN/PIN/Radial/Median/Ulnar Nerves assessed in isolation without deficit  - SILT throughout  - Compartments soft  - Radial artery palpated   - Capillary Refill <3s    LUE:  - Open laceration of 3rd digit with exposed middle and distal phalanx   - No swelling  - No ecchymosis, erythema, or signs of cellulitis  - TTP at injury site  - NonTTP throughout the hand, wrist and remaining aspects of forearm and arm   - AROM and PROM of the shoulder, elbow, wrist, and hand intact without pain  - Axillary/AIN/PIN/Radial/Median/Ulnar Nerves assessed in isolation without deficit  - SILT throughout  - Compartments soft  - Radial artery palpated   - Capillary Refill <3s        RLE:  - Skin intact throughout, no open wounds  - No swelling  - No ecchymosis, erythema, or signs of cellulitis  - NonTTP throughout  - AROM and PROM of the hip, knee, ankle, and foot intact without pain  - TA/EHL/Gastroc/FHL assessed in isolation without deficit  - SILT throughout  - Compartments soft  - DP and PT palpated  - Capillary Refill <3s    LLE:  - Skin intact throughout, no open wounds  - No swelling  - No ecchymosis, erythema, or signs of cellulitis  - NonTTP throughout  - AROM and PROM of the hip, knee, ankle, and foot intact without pain  - TA/EHL/Gastroc/FHL assessed in isolation without deficit  - SILT throughout  - Compartments soft  - DP and PT palpated  - Capillary Refill <3s    Spine/pelvis/axial body:  No tenderness  to palpation of cervical, thoracic, or lumbar spine  No pain with compression of pelvis  No chest wall or abdominal tenderness       Significant Labs: All pertinent labs within the past 24 hours have been reviewed.    Significant Imaging: I have reviewed all pertinent imaging results/findings.

## 2025-04-08 NOTE — DISCHARGE INSTRUCTIONS
You can take alternating doses of Tylenol and Motrin as needed for pain.  You can place lidocaine patches on the areas that hurt the most to help with pain.      For break through pain you can take oxycodone every 4-6 hours as needed for severe pain.    Follow up with Dr. Chicas in clinic.  They will be calling to schedule an appointment for you.  Take the antibiotics as prescribed.      Follow up with the primary care physician.  Return to the emergency department if you have numbness or tingling your arms or legs, or have more than 2 episodes of emesis in a day.

## 2025-04-08 NOTE — ASSESSMENT & PLAN NOTE
Nathan Huff is a 35 y.o. male with PMH of left radial artery transection presenting with a traumatic amputation of the left 3rd digit. He received ancef and TDAP upon arrival.     - Irrigated with 2L of NS and betadine   - Dressed with xeroform, 4x4's, soft roll, volar slab, and ace wrap   - Discussed that given amputation pattern, that this injury is not amenable to replantation and that this will likely require amputation for definitive treatment  - Abx per ED  - Keep splint clean, dry, intact until clinic follow up  - Follow up with Dr. Chicas in Orthopedic Hand clinic later this week (patient will be contacted with appointment details)

## 2025-04-08 NOTE — CONSULTS
[facility] - Emergency Dept  Orthopedics  Consult Note    Patient Name: Nathan Huff  MRN: [id number]  Admission Date: 4/7/2025  Hospital Length of Stay: 0 days  Attending Provider: Carline Victoria,*  Primary Care Provider: London Thorne MD    Inpatient consult to Orthopedic Surgery  Consult performed by: Shaun Marcelino MD  Consult ordered by: Carline Victoria MD        Subjective:     Principal Problem:Traumatic amputation of left middle finger    Chief Complaint:   Chief Complaint   Patient presents with    Laceration     L middle finger partial amputation, arrived with a portion of finger on ice        HPI: Nathan Huff is a 35 y.o. right hand dominant male with PMH significant for left radial artery transection presenting with a left traumatic 3rd finger amputation. 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 He noted immediate pain and immediately wrapped the digit in cloth before arriving at the [facility] [place] [place] ED. He received ancef and tdap immediately upon arrival. Patient denies any head trauma or LOC. The patient denies prior hx of falls. Patient denies numbness and tingling. Denies any other musculoskeletal pain or injuries. No known history of prior hand injury or surgery.    They denies IV drug use.   They denies tobacco use.   They endorse social alcohol use.   They deny immunosuppressant medications.  They denies chemotherapy.  They denies radiation therapy.      Past Medical History:   Diagnosis Date    Psoriasiform dermatitis        Past Surgical History:   Procedure Laterality Date    MOUTH SURGERY         Review of patient's allergies indicates:   Allergen Reactions    Nickel sutures [surgical stainless steel] Rash       Current Facility-Administered Medications   Medication    ondansetron injection 4 mg    ondansetron injection 4 mg     No current outpatient medications on file.     Family History        Problem Relation (Age of Onset)    Diabetes Father    Heart disease Father    Hyperlipidemia Father          Tobacco Use    Smoking status: Former     Current packs/day: 1.50     Average packs/day: 1.5 packs/day for 2.0 years (3.0 ttl pk-yrs)     Types: Cigarettes     Passive exposure: Past    Smokeless tobacco: Former     Types: Snuff, Chew   Substance and Sexual Activity    Alcohol use: Not Currently     Comment: SOCIAL    Drug use: Never    Sexual activity: Yes     Partners: Female     ROS  Constitutional: negative for fevers  Eyes: negative visual changes  ENT: negative for hearing loss  Respiratory: negative for dyspnea  Cardiovascular: negative for chest pain  Gastrointestinal: negative for abdominal pain  Genitourinary: negative for dysuria  Neurological: negative for headaches  Behavioral/Psych: negative for hallucinations  Endocrine: negative for temperature intolerance   Objective:     Vital Signs (Most Recent):  Temp: 98.5 °F (36.9 °C) (04/07/25 1736)  Pulse: 100 (04/07/25 1736)  Resp: 18 (04/07/25 1946)  BP: (!) 158/85 (04/07/25 1736)  SpO2: 99 % (04/07/25 1736) Vital Signs (24h Range):  Temp:  [98.5 °F (36.9 °C)] 98.5 °F (36.9 °C)  Pulse:  [100] 100  Resp:  [18-20] 18  SpO2:  [99 %] 99 %  BP: (158)/(85) 158/85     Weight: 102.1 kg (225 lb)  Height: 6' (182.9 cm)  Body mass index is 30.52 kg/m².    No intake or output data in the 24 hours ending 04/07/25 1953     Ortho/SPM Exam  General:  no acute distress, appears stated age   Neuro: alert and oriented x3  Psych: normal mood  Head: normocephalic, atraumatic.  Eyes: no scleral icterus  Mouth: moist mucous membranes  Cardiovascular: extremities warm and well perfused  Lungs: breathing comfortably, equal chest rise bilat  Skin: clean, dry, intact (any exceptions noted in below musculoskeletal exam)    MSK:  RUE:  - Skin intact throughout, no open wounds  - No swelling  - No ecchymosis, erythema, or signs of cellulitis  - NonTTP throughout  - AROM and PROM  of the shoulder, elbow, wrist, and hand intact without pain  - Axillary/AIN/PIN/Radial/Median/Ulnar Nerves assessed in isolation without deficit  - SILT throughout  - Compartments soft  - Radial artery palpated   - Capillary Refill <3s    LUE:  - Open laceration of 3rd digit with exposed middle and distal phalanx   - No swelling  - No ecchymosis, erythema, or signs of cellulitis  - TTP at injury site  - NonTTP throughout the hand, wrist and remaining aspects of forearm and arm   - AROM and PROM of the shoulder, elbow, wrist, and hand intact without pain  - Axillary/AIN/PIN/Radial/Median/Ulnar Nerves assessed in isolation without deficit  - SILT throughout  - Compartments soft  - Radial artery palpated   - Capillary Refill <3s        RLE:  - Skin intact throughout, no open wounds  - No swelling  - No ecchymosis, erythema, or signs of cellulitis  - NonTTP throughout  - AROM and PROM of the hip, knee, ankle, and foot intact without pain  - TA/EHL/Gastroc/FHL assessed in isolation without deficit  - SILT throughout  - Compartments soft  - DP and PT palpated  - Capillary Refill <3s    LLE:  - Skin intact throughout, no open wounds  - No swelling  - No ecchymosis, erythema, or signs of cellulitis  - NonTTP throughout  - AROM and PROM of the hip, knee, ankle, and foot intact without pain  - TA/EHL/Gastroc/FHL assessed in isolation without deficit  - SILT throughout  - Compartments soft  - DP and PT palpated  - Capillary Refill <3s    Spine/pelvis/axial body:  No tenderness to palpation of cervical, thoracic, or lumbar spine  No pain with compression of pelvis  No chest wall or abdominal tenderness       Significant Labs: All pertinent labs within the past 24 hours have been reviewed.    Significant Imaging: I have reviewed all pertinent imaging results/findings.  Assessment/Plan:     * Traumatic amputation of left middle finger  Nathan Huff is a 35 y.o. male with PMH of left radial artery transection presenting with a  traumatic amputation of the left 3rd digit. He received ancef and TDAP upon arrival.     - Irrigated at bedside and placed in volar slab splint; procedure note below   - Discussed that given amputation pattern, that this injury is not amenable to replantation and that this will likely require revision amputation for definitive treatment  - Bactrim DS x 7 days    - Keep splint clean, dry, intact until clinic follow up  - Follow up with Dr. Chicas in Orthopedic Hand clinic later this week (patient will be contacted with appointment details)    .  Procedure note:  After time out was performed and patient ID, side, and site were verified, the left finger was sterilly prepped in the standard fashion.  With oral prn pain medication, adequate analgesia was obtained, the laceration was thoroughly irrigated with 2 L of normal saline with betadine solution. At this point, the wound was dressed with xeroform and placed in a volar slab splint.         MADELEINE Marcelino MD  Orthopaedic Surgery   Resident Physician, PGY-1  04/07/2025

## 2025-04-08 NOTE — HPI
Nathan Huff is a 35 y.o. male with PMH significant for left radial artery transection presenting with a left traumatic 3rd finger amputation. 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 He noted immediate pain and immediately wrapped the digit in cloth before arriving at the [facility] ED. He received ancef and tdap immediately upon arrival. Patient denies any head trauma or LOC. The patient denies prior hx of falls. Patient denies numbness and tingling. Denies any other musculoskeletal pain or injuries. No known history of prior hand injury or surgery.    They denies IV drug use.   They denies tobacco use.   They endorse social alcohol use.   They deny immunosuppressant medications.  They denies chemotherapy.  They denies radiation therapy.

## 2025-04-09 ENCOUNTER — PATIENT MESSAGE (OUTPATIENT)
Dept: PREADMISSION TESTING | Facility: HOSPITAL | Age: 35
End: 2025-04-09
Payer: COMMERCIAL

## 2025-04-09 ENCOUNTER — ANESTHESIA EVENT (OUTPATIENT)
Dept: SURGERY | Facility: HOSPITAL | Age: 35
End: 2025-04-09
Payer: COMMERCIAL

## 2025-04-09 ENCOUNTER — TELEPHONE (OUTPATIENT)
Dept: ORTHOPEDICS | Facility: CLINIC | Age: 35
End: 2025-04-09

## 2025-04-09 ENCOUNTER — OFFICE VISIT (OUTPATIENT)
Dept: ORTHOPEDICS | Facility: CLINIC | Age: 35
End: 2025-04-09
Payer: COMMERCIAL

## 2025-04-09 VITALS — WEIGHT: 225.31 LBS | HEIGHT: 72 IN | BODY MASS INDEX: 30.52 KG/M2

## 2025-04-09 DIAGNOSIS — S68.113A TRAUMATIC AMPUTATION OF LEFT MIDDLE FINGER: Primary | ICD-10-CM

## 2025-04-09 DIAGNOSIS — S61.209A FINGER, OPEN WOUNDS WITH TENDON INJURY, INITIAL ENCOUNTER: ICD-10-CM

## 2025-04-09 DIAGNOSIS — S62.627B OPEN DISPLACED FRACTURE OF MIDDLE PHALANX OF LEFT LITTLE FINGER, INITIAL ENCOUNTER: ICD-10-CM

## 2025-04-09 DIAGNOSIS — M79.645 FINGER PAIN, LEFT: ICD-10-CM

## 2025-04-09 DIAGNOSIS — S62.639B OPEN FRACTURE OF DISTAL PHALANX OF DIGIT OF LEFT HAND: ICD-10-CM

## 2025-04-09 PROCEDURE — 99999 PR PBB SHADOW E&M-EST. PATIENT-LVL III: CPT | Mod: PBBFAC,,, | Performed by: ORTHOPAEDIC SURGERY

## 2025-04-09 RX ORDER — DICLOFENAC SODIUM 75 MG/1
TABLET, DELAYED RELEASE ORAL
COMMUNITY

## 2025-04-09 RX ORDER — DOCUSATE SODIUM 100 MG/1
100 CAPSULE, LIQUID FILLED ORAL 2 TIMES DAILY
Qty: 30 CAPSULE | Refills: 1 | Status: SHIPPED | OUTPATIENT
Start: 2025-04-09

## 2025-04-09 RX ORDER — HYDROCODONE BITARTRATE AND ACETAMINOPHEN 5; 325 MG/1; MG/1
1 TABLET ORAL EVERY 6 HOURS PRN
Qty: 30 TABLET | Refills: 0 | Status: SHIPPED | OUTPATIENT
Start: 2025-04-09

## 2025-04-09 RX ORDER — ONDANSETRON HYDROCHLORIDE 8 MG/1
8 TABLET, FILM COATED ORAL EVERY 6 HOURS PRN
Qty: 30 TABLET | Refills: 0 | Status: SHIPPED | OUTPATIENT
Start: 2025-04-09

## 2025-04-09 NOTE — H&P (VIEW-ONLY)
[facility]  History & Physical  Orthopedics    SUBJECTIVE:      Chief Complaint:   Chief Complaint   Patient presents with    Left Hand - Injury       Referring Provider: Self, Mandieerral     Patient is a 35 y.o. right hand dominant male who presents today follow up from ED on 4/7/25.   History of Present Illness    CHIEF COMPLAINT:  - Right hand injury from miter saw accident    HPI:  Patient presents with a right hand injury sustained from a miter saw accident. The injury involves his right middle finger, extending into the joint. He reports sensation up to the point of injury and the ability to make a fist and bend the finger prior to treatment. The injury occurred recently, with initial treatment and XRs provided in the ER. He is currently on antibiotics.    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    MEDICATIONS:  - Antibiotics: for finger injury    WORK STATUS:  - Thao, owns his own business  - Subcontractor for multiple contractors        Vitals:    04/09/25 0819   PainSc:   5   PainLoc: Finger       The patient denies any fevers, chills, N/V, D/C and presents for evaluation.    Past Medical History:   Diagnosis Date    Psoriasiform dermatitis      Past Surgical History:   Procedure Laterality Date    MOUTH SURGERY       Review of patient's allergies indicates:   Allergen Reactions    Nickel sutures [surgical stainless steel] Rash     Social History     Social History Narrative    Not on file     Family History   Problem Relation Name Age of Onset    Heart disease Father      Hyperlipidemia Father      Diabetes Father         Current Medications[1]    ROS    Review of Systems:  Constitutional: no fever or chills  Eyes: no visual changes  ENT: no nasal congestion or sore throat  Respiratory: no cough or shortness of breath  Cardiovascular: no chest  pain  Gastrointestinal: no nausea or vomiting, tolerating diet  Musculoskeletal: pain, soreness, decreased ROM, wound, and laceration    OBJECTIVE:      Vital Signs (Most Recent):  Vitals:    04/09/25 0819   Weight: 102.2 kg (225 lb 5 oz)   Height: 6' (1.829 m)     Body mass index is 30.56 kg/m².    Physical Exam    Physical Exam:  Constitutional: The patient appears well-developed and well-nourished. No distress.   Head: Normocephalic and atraumatic.   Nose: Nose normal.   Eyes: Conjunctivae and EOM are normal.   Neck: No tracheal deviation present.   Cardiovascular: Normal rate and intact distal pulses.    Pulmonary/Chest: Effort normal. No respiratory distress.   Abdominal: There is no guarding.   Lymphatic: Negative for adenopathy   Neurological: The patient is alert.   Psychiatric: The patient has a normal mood and affect.     Bilateral Hand/Wrist Examination:  Observation/Inspection:  Swelling  left long finger    Deformity  none  Discoloration  none     Scars   none    Atrophy  none    HAND/WRIST EXAMINATION:  LUE: open laceration of left long finger with exposed middle and distal phalanx, TTP over injury site, SILT intact ulnar aspect of finger  right ROM hand/wrist/elbow full         Neurovascular Exam:  Digits WWP, brisk CR < 3s throughout  NVI motor/LTS to M/R/U nerves, radial pulse 2+  2+ biceps and brachioradialis reflexes    Diagnostic studies and other clinical records review:  X-rays AP, lateral and oblique left long finger taken 4/7/25 are independently reviewed by me and shows open fracture left long finger distal phalanx and middle phalanx with loss of radial bone of distal phalanx.     ASSESSMENT/PLAN:    35 y.o. yo male with   Encounter Diagnoses   Name Primary?    Traumatic amputation of left middle finger Yes    Finger, open wounds with tendon injury, initial encounter     Open fracture of distal phalanx of digit of left hand     Open displaced fracture of middle phalanx of left little finger,  initial encounter     Finger pain, left       Plan: The patient and I had a thorough discussion today. We discussed the working diagnosis as well as several other potential alternative diagnoses. Treatment options were discussed, both conservative and surgical. Conservative treatment options would include things such as activity modifications, workplace modifications, a period of rest, oral vs topical OTC and prescription anti-inflammatory medications, occupational therapy, splinting/bracing, immobilization, corticosteroid injections, and others. Surgical options were discussed as well.     At this time, the patient has exhausted conservative measures and would like to proceed with surgery. Surgery would include left long finger open reduction internal fixation, left hand irrigation and debridement, left long finger rotational flap, left long finger skin graft substitute. Consent signed today in clinic. Light use of the hand will be indicated for the first 4-6 weeks     We have discussed risks, benefits and alternatives of hand surgery which include but are not limited to blood clots in the legs that can travel to the lungs (pulmonary embolism). Pulmonary embolism can cause shortness of breath, chest pain, and even shock. Other risks include urinary tract infection, nausea and vomiting (usually related to pain medication), chronic pain, bleeding, nerve damage, blood vessel injury, scarring and infection of the hand which can require re-operation. Furthermore, the risks of anesthesia include potential heart, lung, kidney, and liver damage.  Informed consent was obtained. he understands and would like to proceed with surgery in the near future.      Assessment & Plan    MEDICATIONS:  - Continue taking antibiotics.  - Take antibiotics after surgery.    REFERRALS:  - Referred to occupational therapy for custom splint making and hand therapy.    PROCEDURES:  - Surgical procedure planned for tomorrow: clean out injured  finger, attempt to preserve finger length, insert pin, and apply skin graft substitute.  - Second procedure planned in 2 weeks for skin graft, using skin from side of hand to cover injured area.  - Potential need for amputation if infection occurs or finger does not heal properly.    FOLLOW UP:  - Follow up in 2 weeks for second surgery (skin graft).    PATIENT INSTRUCTIONS:  - Keep finger dry and protected.  - Use affected hand lightly as a helper hand after 2 weeks, keeping injured finger out of activities.  - Use protective splint as directed by occupational therapist.     The patient's pathophysiology was explained in detail with reference to x-rays, models, other visual aids as appropriate.  Treatment options were discussed in detail.  Questions were invited and answered to the patient's satisfaction. I reviewed Primary care , and other specialty's notes to better coordinate patient's care.          Miguelina Chicas MD    Please be aware that this note has been generated with the assistance of [employer] voice-to-text.  Please excuse any spelling or grammatical errors.  This note was generated with the assistance of ambient listening technology. Verbal consent was obtained by the patient and accompanying visitor(s) for the recording of patient appointment to facilitate this note. I attest to having reviewed and edited the generated note for accuracy, though some syntax or spelling errors may persist. Please contact the author of this note for any clarification.           [1]   Current Outpatient Medications:     diclofenac (VOLTAREN) 75 MG EC tablet, Take 1 tablet twice a day by oral route., Disp: , Rfl:     docusate sodium (COLACE) 100 MG capsule, Take 1 capsule (100 mg total) by mouth 2 (two) times daily., Disp: 30 capsule, Rfl: 1    HYDROcodone-acetaminophen (NORCO) 5-325 mg per tablet, Take 1 tablet by mouth every 6 (six) hours as needed for Pain., Disp: 30 tablet, Rfl: 0    MULTIVITAMIN ORAL, , Disp: , Rfl:      ondansetron (ZOFRAN) 8 MG tablet, Take 1 tablet (8 mg total) by mouth every 6 (six) hours as needed for Nausea., Disp: 30 tablet, Rfl: 0    oxyCODONE (ROXICODONE) 5 MG immediate release tablet, Take 1 tablet (5 mg total) by mouth every 4 (four) hours as needed for Pain., Disp: 12 tablet, Rfl: 0    sulfamethoxazole-trimethoprim 800-160mg (BACTRIM DS) 800-160 mg Tab, Take 1 tablet by mouth 2 (two) times daily. for 7 days, Disp: 14 tablet, Rfl: 0

## 2025-04-09 NOTE — TELEPHONE ENCOUNTER
Spoke to patient to inform them of their surgery arrival time (8 am), [place], [address] [facility] [address]:  Verbalized understanding of instructions for pre-wash, and reviewed NPO after midnight.   You may drink gatorade and water only up to 2 hours prior to arrival, NOTHING ELSE.  Confirmed call in regards to medication instructions from anesthesia.   Confirmed patient was NOT using a rideshare service for surgery arrival or  transportation.   Discussed removing any finger nail polish if applicable   Confirmed no new wounds or abrasions on operative extremity.      [facility] Pre-Op and PACU Visiting Policy  · Each patient will be allowed 2 visitors with 1 visitor at a time. Only 1 swap out allowed.  · Pediatric patients: Both parents are allowed if present.  · Post-Op: If there is more than 1 visitor, the person that is the main caregiver will need to be available for d/c instructions should visit 2nd, and stay with the patient until d/c.  All visitors must be accompanied in and out with an employee.

## 2025-04-09 NOTE — PATIENT INSTRUCTIONS
[facility]  HAND CLINIC SURGERY INSTRUCTIONS  Miguelina Chicas MD  Date of Surgery: 4/10/25    Location of Surgery:      [facility] [place] [address]    PRE-OPERATIVE INSTRUCTIONS:    Dr. Chicas's clinic staff will call you THE DAY BEFORE SURGERY with your arrival time. You will be notified in the afternoon between 11:00AM - 5:00pm. We will attempt to provide your arrival time earlier and will call you if this is at all possible.   10 AM    DO NOT eat or drink after midnight, this includes gum/hard candy, coffee.   You may drink gatorade and water only up to 2 hours prior to arrival, NOTHING ELSE.    You ARE NOT to drive or take an Uber/Lyft/taxi home from surgery. Please arrange a friend/family member to accompany you at the surgery center and drive you home.  Transportation: Wife [facility] Pre-[facility] and PACU Visitor Policy:    Each patient will be allowed 2 visitors with 1 visitor at a time. Only 1 swap out allowed.   Pediatric patients: Both parents are allowed if present.   Post-Op: If there is more than 1 visitor, the person that is the main caregiver will need to be available for d/c instructions should visit 2nd, and stay with the patient until d/c.  All visitors must be accompanied in and out with an employee.       PRE-OPERATIVE MEDICATION INSTRUCTIONS:    If you are diabetic, DO NOT take any diabetic medication the night before surgery or morning of surgery. If you are type I diabetic on an insulin pump, please bring your monitor the morning of surgery.   NA    MUST HOLD SEMAGLUTIDE MEDICATIONS 7 DAYS PRIOR TO SURGERY, examples: Mounjaro, Ozempic, Trulicity.   NA    Please HOLD Vitamins 5 days prior to surgery or sooner, CONTINUE Vitamin D up until the AM of your surgery.    Avoid anti-inflammatory medications 5 days before your surgery. This includes Aleve/Naproxen, Celebrex, Meloxicam/Mobic, Voltaren/Diclofenac.   You may dose ibuprofen/Advil/Motrin up  until the day before your surgery.  You may take extra strength Tylenol/Acetaminophen.    HOLD ALL OTHER MEDICATIONS AM OF SURGERY THAT ARE NOT DISCUSSED ABOVE    Surgical Clearance:   No clearance is needed prior to surgery    POST-OPERATIVE MEDICATION INSTRUCTIONS:    Your post-operative pain medication will be DELIVERED BEDSIDE to you at the surgery center by the [facility]. You DO NOT need to pick this medication up from the [facility].     TAKE post-operative pain medication as directed.   You may also take over-the-counter extra strength Tylenol/acetaminophen as directed on the bottle for breakthrough pain between dosed of prescribed pain medication.   Please note, some pain medications contain Tylenol/acetaminophen.   DO NOT exceed 3000mg of Tylenol/acetaminophen in 1 day.  You may also use an over-the-counter anti-inflammatory medication also known as an NSAID,  (Aleve/Naproxen) as directed on the bottle for breakthrough pain between doses of prescribed pain medication.  DO NOT take NSAIDs if you have been previously instructed not to by a physician.     POST-OPERATIVE INSTRUCTIONS:    DO NOT let your operative area become wet, Your dressings/bandages/splints must remain dry.   Recommend waterproof arm cast cover to be worn when showering and bathing and can be purchased on Amazon. Garbage bags, plastic shopping bags, or umbrella bags can also be used instead.    DO NOT remove any post-operative dressings/bandages/splints until you are seen by Abril Lemons PA-C, Dr. Chicas or Occupational Therapy   These dressings/bandages/splints are in place to keep the operative site clean, dry, and in optimal healing position     ELEVATE your hand/arm above the level of your heart as much as possible to decrease post-operative swelling for first 48 hours.  Swelling after surgery is TO BE EXPECTED.      ICE the operative site following surgery for 20-30 minutes, 4-5 times per day.  Be sure to have a towel  between your dressings/bandages/splint to keep from getting wet.    MOVE the parts of your hand/arm that are not immobilized in a sling or splint.   Make a gentle fist with your fingers, bend/straighten your elbow, etc.   DO NOT attempt any strengthening exercises (hand putty, exercise balls, etc) on your operative side as this can increase swelling.     *CALL the [facility] at [phone]*  If at any time following surgery your dressings/bandages/splints: get wet, come loose, feels tight, feels uncomfortable.  Or if you are concerned about possible infection, worsening pain, worsening swelling or have any other concerns regarding your surgery.   Signs of infection:  fever (over 100.3), chills, body aches, increased warmth, redness, significant increase in swelling & pain at surgical site.     OUTPATIENT FOLLOW UP:  YOU WILL BE SEEN FIRST BY OCCUPATIONAL THERAPY 5-7 DAYS AFTER YOUR SURGERY. *Your splint / soft dressing will be removed as well as your sutures if applicable.  YOU WILL THEN BE SEEN BY ESTELLA CHRISTOPHER PA-C IN CLINIC 2 weeks AFTER SURGERY AND DR. BREWER IN CLINIC 6 WEEKS AFTER SURGERY    Patient IQ (Survey)  A quick questionnaire (2-3 minutes) will be sent to you via text message or email in the next few hours. It will only take a few minutes to complete!  We care about your health, answering the questionnaire allows us to track your progress through your recovery to provide the best outcome for your recovery.

## 2025-04-09 NOTE — ANESTHESIA PREPROCEDURE EVALUATION
"04/09/2025    Nathan Huff is a 35 y.o. male  who presents  for Procedure(s):  IRRIGATION AND DEBRIDEMENT  AMPUTATION, FINGER REVISION AMP LONG FINGER  TENOLYSIS FDP  OPEN REDUCTION, FRACTURE, PHALANX, FINGER        Review of patient's allergies indicates:   Allergen Reactions    Nickel sutures [surgical stainless steel] Rash       Wt Readings from Last 1 Encounters:   04/09/25 0819 102.2 kg (225 lb 5 oz)       BP Readings from Last 3 Encounters:   04/07/25 (!) 158/85   02/01/24 118/74   12/05/22 120/78       Problem List[1]    Past Surgical History:   Procedure Laterality Date    MOUTH SURGERY         Social History[2]      Chemistry        Component Value Date/Time     02/02/2024 0840    K 4.5 02/02/2024 0840     02/02/2024 0840    CO2 28 02/02/2024 0840    BUN 10 02/02/2024 0840    CREATININE 0.9 02/02/2024 0840    GLU 97 02/02/2024 0840        Component Value Date/Time    CALCIUM 9.6 02/02/2024 0840    ALKPHOS 78 02/02/2024 0840    AST 17 02/02/2024 0840    ALT 21 02/02/2024 0840    BILITOT 1.0 02/02/2024 0840    ESTGFRAFRICA >60 04/17/2015 1400    EGFRNONAA >60 04/17/2015 1400            Lab Results   Component Value Date    WBC 6.35 02/02/2024    HGB 15.8 02/02/2024    HCT 47.1 02/02/2024     02/02/2024    CHOL 217 (H) 02/02/2024    TRIG 197 (H) 02/02/2024    HDL 34 (L) 02/02/2024    ALT 21 02/02/2024    AST 17 02/02/2024     02/02/2024    K 4.5 02/02/2024     02/02/2024    CREATININE 0.9 02/02/2024    BUN 10 02/02/2024    CO2 28 02/02/2024    TSH 1.358 02/02/2024    HGBA1C 5.0 12/05/2022       No results for input(s): "PT", "INR", "PROTIME", "APTT" in the last 72 hours.    No results found for this or any previous visit.       Pre-op Assessment    I have reviewed the Patient Summary Reports.     I have reviewed the Nursing Notes. I have reviewed the NPO Status.   I have reviewed the Medications.     Review of Systems  Anesthesia Hx:    No anesthesia records on chart.             " Denies Personal Hx of Anesthesia complications.                    Social:  Former Smoker, No Alcohol Use       Cardiovascular:                hyperlipidemia                               Musculoskeletal:     Traumatic amputation of left middle finger       Spine Disorders: lumbar and cervical            Neurological:      Headaches      Dx of Headaches                           Endocrine:        Obesity / BMI > 30      Physical Exam  General: Well nourished, Cooperative, Alert and Oriented    Airway:  Mallampati: II   Mouth Opening: Normal  TM Distance: Normal  Tongue: Normal  Neck ROM: Normal ROM    Dental:  Intact, Periodontal disease      Past Medical History:   Diagnosis Date    Psoriasiform dermatitis      Past Surgical History:   Procedure Laterality Date    MOUTH SURGERY       Anesthesia Assessment: Preoperative EQUATION    Planned Procedure: Procedure(s) (LRB):  IRRIGATION AND DEBRIDEMENT (Left)  AMPUTATION, FINGER REVISION AMP LONG FINGER (Left)  TENOLYSIS FDP (Left)  OPEN REDUCTION, FRACTURE, PHALANX, FINGER (Left)  Requested Anesthesia Type:Local MAC  Surgeon: Miguelina Chicas MD  Service: Orthopedics  Known or anticipated Date of Surgery:4/10/2025    Surgeon notes: reviewed    Electronic QUestionnaire Assessment completed via nurse interview with patient.      Triage considerations:     The patient has no apparent active cardiac condition (No unstable coronary Syndrome such as severe unstable angina or recent [<1 month] myocardial infarction, decompensated CHF, severe valvular   disease or significant arrhythmia)    Previous anesthesia records:Not available    Last PCP [facility] > 1 year [facility] , [facility]   Subspecialty notes: Ortho    Other important co-morbidities: HLD and Obesity   EKG 4/7/15  Vent. Rate : 064 BPM     Atrial Rate : 064 BPM      P-R Int : 156 ms          QRS Dur : 100 ms       QT Int : 374 ms       P-R-T Axes : 038 046 037 degrees      QTc Int : 385 ms     Normal sinus rhythm   Early  repolarization   Normal ECG   No previous ECGs available   Confirmed by QUYNH TOWNSEND MD ([id number]) on 4/7/2015 5:19:13 PM      Tests already available:  Results have been reviewed.             Instructions given. (See in Nurse's note) Pre op medication instructions sent via portal message.    Optimization:  Anesthesia Preop Clinic Assessment not Indicated    Medical Opinion Indicated: no       Sub-specialist consult indicated: no       Plan:   No pre op medical clearance requested.    Navigation:  Straight Line to surgery.               No tests, anesthesia preop clinic visit, or consult required.    Ht: 6'  Wt: 102.2 kg (225 lb)  BMI: 30.56    Anesthesia Plan  Type of Anesthesia, risks & benefits discussed:    Anesthesia Type: Gen ETT, Gen Supraglottic Airway, Gen Natural Airway  Intra-op Monitoring Plan: Standard ASA Monitors  Post Op Pain Control Plan: IV/PO Opioids PRN and multimodal analgesia  Induction:  IV  Airway Plan: Video  Informed Consent: Informed consent signed with the Patient and all parties understand the risks and agree with anesthesia plan.  All questions answered.   ASA Score: 2  Day of Surgery Review of History & Physical: H&P Update referred to the surgeon/provider.  Anesthesia Plan Notes: Hold antibiotics for intra-op cultures     Preoperative evaluation completed by chart review, updated DOS after patient evaluation and physical examination.    Discussed: general anesthesia with native airway vs supraglottic airway vs ett. Discussed risks associated with general anesthesia to include but not be limited to: dental or lip injury, post operative nausea and vomiting, cardiac arrest, stroke, or even death.     Patient voiced understanding and elects to proceed.       Ready For Surgery From Anesthesia Perspective.     .         [1]   Patient Active Problem List  Diagnosis    Hyperlipidemia    Bulge of lumbar disc without myelopathy    Migraine with aura and without status migrainosus, not intractable     Traumatic amputation of left middle finger   [2]   Social History  Socioeconomic History    Marital status:      Spouse name: Tiffani Huff    Number of children: 3   Tobacco Use    Smoking status: Former     Current packs/day: 1.50     Average packs/day: 1.5 packs/day for 2.0 years (3.0 ttl pk-yrs)     Types: Cigarettes     Passive exposure: Past    Smokeless tobacco: Former     Types: Snuff, Chew   Substance and Sexual Activity    Alcohol use: Not Currently     Comment: SOCIAL    Drug use: Never    Sexual activity: Yes     Partners: Female     Social Drivers of Health     Financial Resource Strain: Patient Declined (2/1/2024)    Overall Financial Resource Strain (CARDIA)     Difficulty of Paying Living Expenses: Patient declined   Food Insecurity: Patient Declined (2/1/2024)    Hunger Vital Sign     Worried About Running Out of Food in the Last Year: Patient declined     Ran Out of Food in the Last Year: Patient declined   Transportation Needs: Patient Declined (2/1/2024)    PRAPARE - Transportation     Lack of Transportation (Medical): Patient declined     Lack of Transportation (Non-Medical): Patient declined   Physical Activity: Sufficiently Active (2/1/2024)    Exercise Vital Sign     Days of Exercise per Week: 5 days     Minutes of Exercise per Session: 90 min   Stress: Patient Declined (2/1/2024)    [employer] [place] [employer] - Occupational Stress Questionnaire     Feeling of Stress : Patient declined   Housing Stability: Patient Declined (2/1/2024)    Housing Stability Vital Sign     Unable to Pay for Housing in the Last Year: Patient declined     Unstable Housing in the Last Year: Patient declined

## 2025-04-09 NOTE — ANESTHESIA PAT ROS NOTE
04/09/2025  Nathan Huff is a 35 y.o., male.      Pre-op Assessment    I have reviewed the Patient Summary Reports.       I have reviewed the Medications.     Review of Systems  Anesthesia Hx:    No anesthesia records on chart.              Social:  Former Smoker, No Alcohol Use       Cardiovascular:                hyperlipidemia                               Musculoskeletal:     Traumatic amputation of left middle finger       Spine Disorders: lumbar and cervical            Neurological:      Headaches                                 Endocrine:        Obesity / BMI > 30       Past Medical History:   Diagnosis Date    Psoriasiform dermatitis      Past Surgical History:   Procedure Laterality Date    MOUTH SURGERY       Anesthesia Assessment: Preoperative EQUATION    Planned Procedure: Procedure(s) (LRB):  IRRIGATION AND DEBRIDEMENT (Left)  AMPUTATION, FINGER REVISION AMP LONG FINGER (Left)  TENOLYSIS FDP (Left)  OPEN REDUCTION, FRACTURE, PHALANX, FINGER (Left)  Requested Anesthesia Type:Local MAC  Surgeon: Miguelina Chicas MD  Service: Orthopedics  Known or anticipated Date of Surgery:4/10/2025    Surgeon notes: reviewed    Electronic QUestionnaire Assessment completed via nurse interview with patient.      Triage considerations:     The patient has no apparent active cardiac condition (No unstable coronary Syndrome such as severe unstable angina or recent [<1 month] myocardial infarction, decompensated CHF, severe valvular   disease or significant arrhythmia)    Previous anesthesia records:Not available    Last PCP note: > 1 year ago , within [facility]   Subspecialty notes: Ortho    Other important co-morbidities: HLD and Obesity   EKG 4/7/15  Vent. Rate : 064 BPM     Atrial Rate : 064 BPM      P-R Int : 156 ms          QRS Dur : 100 ms       QT Int : 374 ms       P-R-T Axes : 038 046 037 degrees      QTc Int  : 385 ms     Normal sinus rhythm   Early repolarization   Normal ECG   No previous ECGs available   Confirmed by QUYNH TOWNSEND MD ([id number]) on 4/7/2015 5:19:13 PM      Tests already available:  Results have been reviewed.             Instructions given. (See in Nurse's note) Pre op medication instructions sent via portal message.    Optimization:  Anesthesia Preop Clinic Assessment not Indicated    Medical Opinion Indicated: no       Sub-specialist consult indicated: no       Plan:   No pre op medical clearance requested.    Navigation:  Straight Line to surgery.               No tests, anesthesia preop clinic visit, or consult required.    Ht: 6'  Wt: 102.2 kg (225 lb)  BMI: 30.56

## 2025-04-09 NOTE — PROGRESS NOTES
[facility]  History & Physical  Orthopedics    SUBJECTIVE:      Chief Complaint:   Chief Complaint   Patient presents with    Left Hand - Injury       Referring Provider: Self, Mandieerral     Patient is a 35 y.o. right hand dominant male who presents today follow up from ED on 4/7/25.   History of Present Illness    CHIEF COMPLAINT:  - Left hand injury from miter saw accident    HPI:  Patient presents with a left hand injury sustained from a miter saw accident. The injury involves his left middle finger, extending into the joint. He reports sensation up to the point of injury and the ability to make a fist and bend the finger prior to treatment. The injury occurred recently, with initial treatment and XRs provided in the ER. He is currently on antibiotics.    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    MEDICATIONS:  - Antibiotics: for finger injury    WORK STATUS:  - Thao, owns his own business  - Subcontractor for multiple contractors        Vitals:    04/09/25 0819   PainSc:   5   PainLoc: Finger       The patient denies any fevers, chills, N/V, D/C and presents for evaluation.    Past Medical History:   Diagnosis Date    Psoriasiform dermatitis      Past Surgical History:   Procedure Laterality Date    MOUTH SURGERY       Review of patient's allergies indicates:   Allergen Reactions    Nickel sutures [surgical stainless steel] Rash     Social History     Social History Narrative    Not on file     Family History   Problem Relation Name Age of Onset    Heart disease Father      Hyperlipidemia Father      Diabetes Father         Current Medications[1]    ROS    Review of Systems:  Constitutional: no fever or chills  Eyes: no visual changes  ENT: no nasal congestion or sore throat  Respiratory: no cough or shortness of breath  Cardiovascular: no chest  pain  Gastrointestinal: no nausea or vomiting, tolerating diet  Musculoskeletal: pain, soreness, decreased ROM, wound, and laceration    OBJECTIVE:      Vital Signs (Most Recent):  Vitals:    04/09/25 0819   Weight: 102.2 kg (225 lb 5 oz)   Height: 6' (1.829 m)     Body mass index is 30.56 kg/m².    Physical Exam    Physical Exam:  Constitutional: The patient appears well-developed and well-nourished. No distress.   Head: Normocephalic and atraumatic.   Nose: Nose normal.   Eyes: Conjunctivae and EOM are normal.   Neck: No tracheal deviation present.   Cardiovascular: Normal rate and intact distal pulses.    Pulmonary/Chest: Effort normal. No respiratory distress.   Abdominal: There is no guarding.   Lymphatic: Negative for adenopathy   Neurological: The patient is alert.   Psychiatric: The patient has a normal mood and affect.     Bilateral Hand/Wrist Examination:  Observation/Inspection:  Swelling  left long finger    Deformity  none  Discoloration  none     Scars   none    Atrophy  none    HAND/WRIST EXAMINATION:  LUE: open laceration of left long finger with exposed middle and distal phalanx, TTP over injury site, SILT intact ulnar aspect of finger  right ROM hand/wrist/elbow full         Neurovascular Exam:  Digits WWP, brisk CR < 3s throughout  NVI motor/LTS to M/R/U nerves, radial pulse 2+  2+ biceps and brachioradialis reflexes    Diagnostic studies and other clinical records review:  X-rays AP, lateral and oblique left long finger taken 4/7/25 are independently reviewed by me and shows open fracture left long finger distal phalanx and middle phalanx with loss of radial bone of distal phalanx.     ASSESSMENT/PLAN:    35 y.o. yo male with   Encounter Diagnoses   Name Primary?    Traumatic amputation of left middle finger Yes    Finger, open wounds with tendon injury, initial encounter     Open fracture of distal phalanx of digit of left hand     Open displaced fracture of middle phalanx of left little finger,  initial encounter     Finger pain, left       Plan: The patient and I had a thorough discussion today. We discussed the working diagnosis as well as several other potential alternative diagnoses. Treatment options were discussed, both conservative and surgical. Conservative treatment options would include things such as activity modifications, workplace modifications, a period of rest, oral vs topical OTC and prescription anti-inflammatory medications, occupational therapy, splinting/bracing, immobilization, corticosteroid injections, and others. Surgical options were discussed as well.     At this time, the patient has exhausted conservative measures and would like to proceed with surgery. Surgery would include left long finger open reduction internal fixation, left hand irrigation and debridement, left long finger rotational flap, left long finger skin graft substitute. Consent signed today in clinic. Light use of the hand will be indicated for the first 4-6 weeks     We have discussed risks, benefits and alternatives of hand surgery which include but are not limited to blood clots in the legs that can travel to the lungs (pulmonary embolism). Pulmonary embolism can cause shortness of breath, chest pain, and even shock. Other risks include urinary tract infection, nausea and vomiting (usually related to pain medication), chronic pain, bleeding, nerve damage, blood vessel injury, scarring and infection of the hand which can require re-operation. Furthermore, the risks of anesthesia include potential heart, lung, kidney, and liver damage.  Informed consent was obtained. he understands and would like to proceed with surgery in the near future.      Assessment & Plan    MEDICATIONS:  - Continue taking antibiotics.  - Take antibiotics after surgery.    REFERRALS:  - Referred to occupational therapy for custom splint making and hand therapy.    PROCEDURES:  - Surgical procedure planned for tomorrow: clean out injured  finger, attempt to preserve finger length, insert pin, and apply skin graft substitute.  - Second procedure planned in 2 weeks for skin graft, using skin from side of hand to cover injured area.  - Potential need for amputation if infection occurs or finger does not heal properly.    FOLLOW UP:  - Follow up in 2 weeks for second surgery (skin graft).    PATIENT INSTRUCTIONS:  - Keep finger dry and protected.  - Use affected hand lightly as a helper hand after 2 weeks, keeping injured finger out of activities.  - Use protective splint as directed by occupational therapist.     The patient's pathophysiology was explained in detail with reference to x-rays, models, other visual aids as appropriate.  Treatment options were discussed in detail.  Questions were invited and answered to the patient's satisfaction. I reviewed Primary care , and other specialty's notes to better coordinate patient's care.          Miguelina Chicas MD    Please be aware that this note has been generated with the assistance of Ami[employer] voice-to-text.  Please excuse any spelling or grammatical errors.  This note was generated with the assistance of ambient listening technology. Verbal consent was obtained by the patient and accompanying visitor(s) for the recording of patient appointment to facilitate this note. I attest to having reviewed and edited the generated note for accuracy, though some syntax or spelling errors may persist. Please contact the author of this note for any clarification.           [1]   Current Outpatient Medications:     diclofenac (VOLTAREN) 75 MG EC tablet, Take 1 tablet twice a day by oral route., Disp: , Rfl:     docusate sodium (COLACE) 100 MG capsule, Take 1 capsule (100 mg total) by mouth 2 (two) times daily., Disp: 30 capsule, Rfl: 1    HYDROcodone-acetaminophen (NORCO) 5-325 mg per tablet, Take 1 tablet by mouth every 6 (six) hours as needed for Pain., Disp: 30 tablet, Rfl: 0    MULTIVITAMIN ORAL, , Disp: , Rfl:      ondansetron (ZOFRAN) 8 MG tablet, Take 1 tablet (8 mg total) by mouth every 6 (six) hours as needed for Nausea., Disp: 30 tablet, Rfl: 0    oxyCODONE (ROXICODONE) 5 MG immediate release tablet, Take 1 tablet (5 mg total) by mouth every 4 (four) hours as needed for Pain., Disp: 12 tablet, Rfl: 0    sulfamethoxazole-trimethoprim 800-160mg (BACTRIM DS) 800-160 mg Tab, Take 1 tablet by mouth 2 (two) times daily. for 7 days, Disp: 14 tablet, Rfl: 0

## 2025-04-10 ENCOUNTER — ANESTHESIA (OUTPATIENT)
Dept: SURGERY | Facility: HOSPITAL | Age: 35
End: 2025-04-10
Payer: COMMERCIAL

## 2025-04-10 ENCOUNTER — HOSPITAL ENCOUNTER (OUTPATIENT)
Facility: HOSPITAL | Age: 35
Discharge: HOME | End: 2025-04-10
Attending: ORTHOPAEDIC SURGERY | Admitting: ORTHOPAEDIC SURGERY
Payer: COMMERCIAL

## 2025-04-10 VITALS
BODY MASS INDEX: 30.48 KG/M2 | SYSTOLIC BLOOD PRESSURE: 114 MMHG | HEIGHT: 72 IN | OXYGEN SATURATION: 100 % | RESPIRATION RATE: 17 BRPM | WEIGHT: 225 LBS | TEMPERATURE: 98 F | HEART RATE: 47 BPM | DIASTOLIC BLOOD PRESSURE: 73 MMHG

## 2025-04-10 DIAGNOSIS — S66.912A: ICD-10-CM

## 2025-04-10 DIAGNOSIS — S68.113A TRAUMATIC AMPUTATION OF LEFT MIDDLE FINGER: Primary | ICD-10-CM

## 2025-04-10 DIAGNOSIS — M79.642 LEFT HAND PAIN: ICD-10-CM

## 2025-04-10 PROCEDURE — 87206 SMEAR FLUORESCENT/ACID STAI: CPT | Mod: 59 | Performed by: ORTHOPAEDIC SURGERY

## 2025-04-10 PROCEDURE — 37000009 HC ANESTHESIA EA ADD 15 MINS: Performed by: ORTHOPAEDIC SURGERY

## 2025-04-10 PROCEDURE — 94761 N-INVAS EAR/PLS OXIMETRY MLT: CPT

## 2025-04-10 PROCEDURE — C1713 ANCHOR/SCREW BN/BN,TIS/BN: HCPCS | Performed by: ORTHOPAEDIC SURGERY

## 2025-04-10 PROCEDURE — 25000003 PHARM REV CODE 250

## 2025-04-10 PROCEDURE — 71000033 HC RECOVERY, INTIAL HOUR: Performed by: ORTHOPAEDIC SURGERY

## 2025-04-10 PROCEDURE — 36000709 HC OR TIME LEV III EA ADD 15 MIN: Performed by: ORTHOPAEDIC SURGERY

## 2025-04-10 PROCEDURE — 37000008 HC ANESTHESIA 1ST 15 MINUTES: Performed by: ORTHOPAEDIC SURGERY

## 2025-04-10 PROCEDURE — 63600175 PHARM REV CODE 636 W HCPCS: Performed by: STUDENT IN AN ORGANIZED HEALTH CARE EDUCATION/TRAINING PROGRAM

## 2025-04-10 PROCEDURE — 87075 CULTR BACTERIA EXCEPT BLOOD: CPT | Performed by: ORTHOPAEDIC SURGERY

## 2025-04-10 PROCEDURE — 87070 CULTURE OTHR SPECIMN AEROBIC: CPT | Performed by: ORTHOPAEDIC SURGERY

## 2025-04-10 PROCEDURE — 25000003 PHARM REV CODE 250: Performed by: PHYSICIAN ASSISTANT

## 2025-04-10 PROCEDURE — 99900035 HC TECH TIME PER 15 MIN (STAT)

## 2025-04-10 PROCEDURE — 25000003 PHARM REV CODE 250: Performed by: STUDENT IN AN ORGANIZED HEALTH CARE EDUCATION/TRAINING PROGRAM

## 2025-04-10 PROCEDURE — 87102 FUNGUS ISOLATION CULTURE: CPT | Performed by: ORTHOPAEDIC SURGERY

## 2025-04-10 PROCEDURE — 63600175 PHARM REV CODE 636 W HCPCS: Performed by: ORTHOPAEDIC SURGERY

## 2025-04-10 PROCEDURE — 15275 SKIN SUB GRAFT FACE/NK/HF/G: CPT | Mod: 51,,, | Performed by: ORTHOPAEDIC SURGERY

## 2025-04-10 PROCEDURE — 11012 DEB SKIN BONE AT FX SITE: CPT | Mod: 51,,, | Performed by: ORTHOPAEDIC SURGERY

## 2025-04-10 PROCEDURE — 87116 MYCOBACTERIA CULTURE: CPT | Performed by: ORTHOPAEDIC SURGERY

## 2025-04-10 PROCEDURE — 87205 SMEAR GRAM STAIN: CPT | Performed by: ORTHOPAEDIC SURGERY

## 2025-04-10 PROCEDURE — 71000015 HC POSTOP RECOV 1ST HR: Performed by: ORTHOPAEDIC SURGERY

## 2025-04-10 PROCEDURE — 26746 TREAT FINGER FRACTURE EACH: CPT | Mod: F2,,, | Performed by: ORTHOPAEDIC SURGERY

## 2025-04-10 PROCEDURE — 36000708 HC OR TIME LEV III 1ST 15 MIN: Performed by: ORTHOPAEDIC SURGERY

## 2025-04-10 PROCEDURE — 87206 SMEAR FLUORESCENT/ACID STAI: CPT | Performed by: ORTHOPAEDIC SURGERY

## 2025-04-10 PROCEDURE — 63600175 PHARM REV CODE 636 W HCPCS

## 2025-04-10 DEVICE — .045" X 6" ST GUIDE WIRE
Type: IMPLANTABLE DEVICE | Site: FINGER | Status: FUNCTIONAL
Brand: ACUMED

## 2025-04-10 DEVICE — INTEGRA® MESHED BILAYER WOUND MATRIX 2 IN*2 IN (5 CM*5 CM)
Type: IMPLANTABLE DEVICE | Site: FINGER | Status: FUNCTIONAL
Brand: INTEGRA®

## 2025-04-10 RX ORDER — BUPIVACAINE HYDROCHLORIDE 2.5 MG/ML
INJECTION, SOLUTION EPIDURAL; INFILTRATION; INTRACAUDAL; PERINEURAL
Status: DISCONTINUED | OUTPATIENT
Start: 2025-04-10 | End: 2025-04-10 | Stop reason: HOSPADM

## 2025-04-10 RX ORDER — ACETAMINOPHEN 325 MG/1
325 TABLET ORAL EVERY 6 HOURS PRN
COMMUNITY

## 2025-04-10 RX ORDER — ONDANSETRON HYDROCHLORIDE 2 MG/ML
INJECTION, SOLUTION INTRAVENOUS
Status: DISCONTINUED | OUTPATIENT
Start: 2025-04-10 | End: 2025-04-10

## 2025-04-10 RX ORDER — ACETAMINOPHEN 500 MG
1000 TABLET ORAL
Status: COMPLETED | OUTPATIENT
Start: 2025-04-10 | End: 2025-04-10

## 2025-04-10 RX ORDER — FENTANYL CITRATE 50 UG/ML
25 INJECTION, SOLUTION INTRAMUSCULAR; INTRAVENOUS EVERY 5 MIN PRN
Status: DISCONTINUED | OUTPATIENT
Start: 2025-04-10 | End: 2025-04-10 | Stop reason: HOSPADM

## 2025-04-10 RX ORDER — OXYCODONE HYDROCHLORIDE 5 MG/1
5 TABLET ORAL
Status: DISCONTINUED | OUTPATIENT
Start: 2025-04-10 | End: 2025-04-10 | Stop reason: HOSPADM

## 2025-04-10 RX ORDER — METHOCARBAMOL 500 MG/1
1000 TABLET, FILM COATED ORAL ONCE AS NEEDED
Status: DISCONTINUED | OUTPATIENT
Start: 2025-04-10 | End: 2025-04-10 | Stop reason: HOSPADM

## 2025-04-10 RX ORDER — PROPOFOL 10 MG/ML
VIAL (ML) INTRAVENOUS
Status: DISCONTINUED | OUTPATIENT
Start: 2025-04-10 | End: 2025-04-10

## 2025-04-10 RX ORDER — IBUPROFEN 200 MG
200 TABLET ORAL EVERY 6 HOURS PRN
COMMUNITY

## 2025-04-10 RX ORDER — HYDROCODONE BITARTRATE AND ACETAMINOPHEN 5; 325 MG/1; MG/1
1 TABLET ORAL EVERY 4 HOURS PRN
Status: DISCONTINUED | OUTPATIENT
Start: 2025-04-10 | End: 2025-04-10 | Stop reason: HOSPADM

## 2025-04-10 RX ORDER — LIDOCAINE HYDROCHLORIDE 10 MG/ML
INJECTION, SOLUTION EPIDURAL; INFILTRATION; INTRACAUDAL; PERINEURAL
Status: DISCONTINUED | OUTPATIENT
Start: 2025-04-10 | End: 2025-04-10 | Stop reason: HOSPADM

## 2025-04-10 RX ORDER — MIDAZOLAM HYDROCHLORIDE 1 MG/ML
INJECTION INTRAMUSCULAR; INTRAVENOUS
Status: DISCONTINUED | OUTPATIENT
Start: 2025-04-10 | End: 2025-04-10

## 2025-04-10 RX ORDER — HALOPERIDOL LACTATE 5 MG/ML
0.5 INJECTION, SOLUTION INTRAMUSCULAR EVERY 10 MIN PRN
Status: DISCONTINUED | OUTPATIENT
Start: 2025-04-10 | End: 2025-04-10 | Stop reason: HOSPADM

## 2025-04-10 RX ORDER — FENTANYL CITRATE 50 UG/ML
INJECTION, SOLUTION INTRAMUSCULAR; INTRAVENOUS
Status: DISCONTINUED | OUTPATIENT
Start: 2025-04-10 | End: 2025-04-10

## 2025-04-10 RX ORDER — MUPIROCIN 20 MG/G
OINTMENT TOPICAL 2 TIMES DAILY
Status: DISCONTINUED | OUTPATIENT
Start: 2025-04-10 | End: 2025-04-10 | Stop reason: HOSPADM

## 2025-04-10 RX ORDER — CELECOXIB 200 MG/1
400 CAPSULE ORAL
Status: COMPLETED | OUTPATIENT
Start: 2025-04-10 | End: 2025-04-10

## 2025-04-10 RX ORDER — CEFAZOLIN 2 G/1
2 INJECTION, POWDER, FOR SOLUTION INTRAMUSCULAR; INTRAVENOUS
Status: COMPLETED | OUTPATIENT
Start: 2025-04-10 | End: 2025-04-10

## 2025-04-10 RX ORDER — MUPIROCIN 20 MG/G
OINTMENT TOPICAL
Status: DISCONTINUED | OUTPATIENT
Start: 2025-04-10 | End: 2025-04-10 | Stop reason: HOSPADM

## 2025-04-10 RX ORDER — LIDOCAINE HYDROCHLORIDE 20 MG/ML
INJECTION INTRAVENOUS
Status: DISCONTINUED | OUTPATIENT
Start: 2025-04-10 | End: 2025-04-10

## 2025-04-10 RX ADMIN — PROPOFOL 50 MG: 10 INJECTION, EMULSION INTRAVENOUS at 11:04

## 2025-04-10 RX ADMIN — PROPOFOL 125 MCG/KG/MIN: 10 INJECTION, EMULSION INTRAVENOUS at 11:04

## 2025-04-10 RX ADMIN — MUPIROCIN: 20 OINTMENT TOPICAL at 08:04

## 2025-04-10 RX ADMIN — ACETAMINOPHEN 1000 MG: 500 TABLET ORAL at 08:04

## 2025-04-10 RX ADMIN — CELECOXIB 400 MG: 100 CAPSULE ORAL at 08:04

## 2025-04-10 RX ADMIN — CEFAZOLIN 2 G: 2 INJECTION, POWDER, FOR SOLUTION INTRAMUSCULAR; INTRAVENOUS at 11:04

## 2025-04-10 RX ADMIN — SODIUM CHLORIDE: 0.9 INJECTION, SOLUTION INTRAVENOUS at 12:04

## 2025-04-10 RX ADMIN — ONDANSETRON 4 MG: 2 INJECTION INTRAMUSCULAR; INTRAVENOUS at 11:04

## 2025-04-10 RX ADMIN — MIDAZOLAM HYDROCHLORIDE 2 MG: 2 INJECTION, SOLUTION INTRAMUSCULAR; INTRAVENOUS at 10:04

## 2025-04-10 RX ADMIN — FENTANYL CITRATE 25 MCG: 50 INJECTION, SOLUTION INTRAMUSCULAR; INTRAVENOUS at 11:04

## 2025-04-10 RX ADMIN — LIDOCAINE HYDROCHLORIDE 50 MG: 20 INJECTION INTRAVENOUS at 11:04

## 2025-04-10 RX ADMIN — FENTANYL CITRATE 50 MCG: 50 INJECTION, SOLUTION INTRAMUSCULAR; INTRAVENOUS at 11:04

## 2025-04-10 RX ADMIN — GLYCOPYRROLATE 0.2 MG: 0.2 INJECTION, SOLUTION INTRAMUSCULAR; INTRAVENOUS at 11:04

## 2025-04-10 RX ADMIN — FENTANYL CITRATE 25 MCG: 50 INJECTION, SOLUTION INTRAMUSCULAR; INTRAVENOUS at 12:04

## 2025-04-10 RX ADMIN — SODIUM CHLORIDE: 0.9 INJECTION, SOLUTION INTRAVENOUS at 10:04

## 2025-04-10 NOTE — PLAN OF CARE
Pre op procedure complete. All questions answered. Pt lying in bed, call bell in reach. Pt's belongings in bag at bedside will leave in locker. Wife brought to bedside. Still needs site mame, update, & anes consent.

## 2025-04-10 NOTE — PLAN OF CARE
Discharge instructions reviewed and pt verbalizes understanding. Pain control appropriate. Arm sling on. Dressing is clean, dry, and intact. VSS and afebrile.  Meds delivered to bedside. Pt ambulatory. AVS complete.

## 2025-04-10 NOTE — BRIEF OP NOTE
[facility] ([facility])  Surgery Department  Operative Note    SUMMARY     Date of Procedure: 4/10/2025     Procedure:   Left long finger irrigation and debridement  Open reduction internal fixation left long finger intra-articular distal phalanx and middle phalanx fractures  Skin graft substitute application    Surgeons and Role:     * Miguelina Chicas MD - Primary    Assisting Surgeon: None    Pre-Operative Diagnosis: Traumatic amputation of left middle finger [S68.113A]  Traumatic rupture of tendon of left hand, initial encounter [S66.912A]    Post-Operative Diagnosis: Post-Op Diagnosis Codes:     * Traumatic amputation of left middle finger [S68.113A]     * Traumatic rupture of tendon of left hand, initial encounter [S66.912A]    Anesthesia: Local MAC    Estimated Blood Loss (EBL): 1 mL           Implants:   Implant Name Type Inv. Item Serial No.  Lot No. LRB No. Used Action   GUIDEWIRE ST SM BNE .[id number] - [id number]  GUIDEWIRE ST SM BNE .[id number]  [employer] [id number] Left 1 Implanted   DRESSING MATRIX BILAYER 2X2 - [id number]  DRESSING MATRIX BILAYER 2X2  [employer] [id number] Left 1 Implanted       Specimens:   Specimen (24h ago, onward)      None           ID Type Source Tests Collected by Time Destination   A : Left Hand finger wound culture Wound Finger, Left Hand CULTURE, ANAEROBIC, CULTURE, FUNGUS, GRAM STAIN, DIRECT AFB STAIN, CULTURE, AEROBIC  (SPECIFY SOURCE), AFB CULTURE & SMEAR Miguelina Chicas MD 4/10/2025 [id number]               Condition: Good    Disposition: PACU - hemodynamically stable.    Attestation: Op Note Attestation: I was physically present and scrubbed for the entire procedure.

## 2025-04-10 NOTE — DISCHARGE INSTRUCTIONS
HAND SURGERY - Care of the Hand after Surgery       Post-Op Care  It is important to follow your orthopaedic surgeon's instructions carefully after you return home. You should ask   someone to check on you that evening. The protocols described here are general in nature. Every person and   every surgery is different so the information given here is for guidance only. If you have questions you should   contact us.     Day of Surgery    You were place in a plaster splint today to protect the surgical area during healing. Do not remove the plaster splint until you are seen by Occupational Therapy, Abril Lemons PA-C or Dr. Chicas for your first postop visit    The hand and fingers may be numb for quite some time after surgery. This is due to the anesthetic block used at the time of surgery for pain control.   If you have an arm sling you may remove the sling at your convenience once you regain control of your arm from the anesthetic block.     Begin taking liquids and food as soon as you can. You should always eat some solid food, a sandwich or light meal, a little while before taking your pain meds.     Day 3  Things are much the same on the third day after your surgery. Usually you have less pain and feel like doing more.    Showering: It is important to keep the incision absolutely dry while showering or bathing (use two plastic bags over your hand) or a shower bag.   If you feel that the pain medication you were given after surgery is stronger than you really need you can reduce the dose, take it less frequently or switch to ibuprofen or Tylenol. If you received antibiotics they should be taken until the entire prescription is completed.      Week 2  We will see you back after your surgery to review with you what was done in surgery and will talk about rehab and answer any questions you may have.       HAND SURGERY  Driving: You can drive if you are comfortable and have regained full finger movements and if you  "have sufficient power to control the vehicle.   Return to work: Timing of your return to work is variable according to your occupation and specific surgery. We should discuss this at your follow up visit if not already discussed prior.  Elevation: Hand elevation is important to prevent swelling and stiffness of the fingers. One minute of leaving your hand dangling negates four hours of keeping it elevated. Please remember not to walk with your hand hanging down or to sit with your hand resting in your lap. It is fine, however, to lower your hand for light use and you should get back to normal light activities as soon as possible as guided by common sense.       HAND SURGERY - Common Concerns and Frequently Asked Questions    When to Call the Doctor  Pain, burning, or numbness of the fingers or the back of the hand not relieved by elevation of the arm  Pale or cold finger; bluish nail beds  Red line or streak going up the arm  Excessive swelling  Fever over 100.3ºF  Pain unrelieved by pain medication    Tips for one armed living  It helps to have...   In the shower   Plastic bags and rubber bands to cover bandages - the bags that newspapers come in are good to cover the hand and wrist. Otherwise small trash can liners will do. Use two at a time.   Bottle sponge (soft sponge on a long stick) - for the armpit of your "good" hand.   Shower brush   A hair brush in the shower will help you to wash your hair.   Cotton indigo cloth bathrobe - to dry your back.    In the bathroom   Toothpaste, shampoo, etc. in flip-top or pump (not screw top) dispensers.   Consider an electric razor.   Flossers (dental floss on a "Y" shaped handle).    In the kitchen   Dycem mat (rubber jar opener mat) - to help open jars, but also keep things from sliding around while you are working on them.    Double suction cup pads ("little Octopus") - to hold items while you use or wash them.   Electric can opener with a lid magnet strong enough to " "hold the can in the air - for one handed use.   In the bedroom   Back scratcher.   Large sleeve shirts and tops.   Put away clothing which buttons, fastens or snaps in the back or which uses drawstrings.    Sports bra or a camisole instead of a bra.   [employer] nylons can be pulled on one handed - most others can't.   A "wash and wear" haircut.     "

## 2025-04-10 NOTE — DISCHARGE SUMMARY
[place] [facility] - Surgery (Hospital)  Discharge Note  Short Stay    Procedure(s) (LRB):  Left long finger irrigation and debridement  Open reduction internal fixation left long finger intra-articular distal phalanx and middle phalanx fractures  Skin graft substitute application    OUTCOME: Patient tolerated treatment/procedure well without complication and is now ready for discharge.    DISPOSITION: Home or Self Care    FINAL DIAGNOSIS:  left long finger traumatic amputation    FOLLOWUP: In clinic    DISCHARGE INSTRUCTIONS:    Discharge Procedure Orders   Diet general     Keep surgical extremity elevated     Ice to affected area     Lifting restrictions     No driving, operating heavy equipment or signing legal documents while taking pain medication.     Leave dressing on - Keep it clean, dry, and intact until clinic visit     Call MD for:  temperature >100.4     Call MD for:  persistent nausea and vomiting     Call MD for:  severe uncontrolled pain     Call MD for:  difficulty breathing, headache or visual disturbances     Call MD for:  redness, tenderness, or signs of infection (pain, swelling, redness, odor or green/yellow discharge around incision site)     Call MD for:  hives     Call MD for:  persistent dizziness or light-headedness     Call MD for:  extreme fatigue

## 2025-04-10 NOTE — TRANSFER OF CARE
Anesthesia Transfer of Care Note    Patient: Nathan Huff    Procedure(s) Performed: Procedure(s) (LRB):  IRRIGATION AND DEBRIDEMENT (Left)  TENOLYSIS FDP (Left)  OPEN REDUCTION, FRACTURE, PHALANX, FINGER (Left)    Patient location: PACU    Anesthesia Type: general    Transport from OR: Transported from OR on 6-10 L/min O2 by face mask with adequate spontaneous ventilation    Post pain: adequate analgesia    Post assessment: no apparent anesthetic complications    Post vital signs: stable    Level of consciousness: lethargic    Nausea/Vomiting: no nausea/vomiting    Complications: none    Transfer of care protocol was followed      Last vitals: Visit Vitals  BP (!) 131/91 (BP Location: Right arm, Patient Position: Lying)   Temp 37.2 °C (99 °F) (Temporal)   Resp 18   Ht 6' (1.829 m)   Wt 102.1 kg (225 lb)   SpO2 97%   BMI 30.52 kg/m²

## 2025-04-11 LAB
ACID FAST MOD KINY STN SPEC: NORMAL
ACID FAST MOD KINY STN SPEC: NORMAL
GRAM STN SPEC: NORMAL
GRAM STN SPEC: NORMAL

## 2025-04-11 NOTE — ANESTHESIA POSTPROCEDURE EVALUATION
Anesthesia Post Evaluation    Patient: Nathan Huff    Procedure(s) Performed: Procedure(s) (LRB):  IRRIGATION AND DEBRIDEMENT (Left)  TENOLYSIS FDP (Left)  OPEN REDUCTION, FRACTURE, PHALANX, FINGER (Left)    Final Anesthesia Type: general (native airway)      Patient location during evaluation: PACU  Patient participation: Yes- Able to Participate  Level of consciousness: awake and alert and oriented  Post-procedure vital signs: reviewed and stable  Pain management: adequate  Airway patency: patent    PONV status at discharge: No PONV  Anesthetic complications: no      Cardiovascular status: blood pressure returned to baseline  Respiratory status: unassisted, spontaneous ventilation and room air  Hydration status: euvolemic  Follow-up not needed.              Vitals Value Taken Time   /73 04/10/25 13:46   Temp 36.7 °C (98.1 °F) 04/10/25 13:36   Pulse 55 04/10/25 13:49   Resp 18 04/10/25 13:49   SpO2 100 % 04/10/25 13:49   Vitals shown include unfiled device data.      Event Time   Out of Recovery 13:34:00         Pain/Bryan Score: Pain Rating Prior to Med Admin: 0 (4/10/2025  8:50 AM)  Bryan Score: 10 (4/10/2025  1:36 PM)

## 2025-04-11 NOTE — OP NOTE
[facility] \[facility]\"")  Surgery Department  Operative Note    SUMMARY     Date of Procedure: 4/10/2025     Procedure:   1.  Left long finger open reduction internal fixation intra-articular distal phalanx and middle phalanx fractures, cpt 74846  2.  Left long finger irrigation and excisional debridement skin, subcuatenous tissue and bone, cpt 56971  3.  Skin graft substitute application left long finger, cpt 94015    Surgeon(s) and Role:     * Miguelina Chicas MD - Primary    Assisting Surgeon: None    Pre-Operative Diagnosis: Traumatic amputation of left middle finger [S68.113A]  Traumatic rupture of tendon of left hand, initial encounter [S66.912A]    Post-Operative Diagnosis: Post-Op Diagnosis Codes:     * Traumatic amputation of left middle finger [S68.113A]    Anesthesia: Local MAC    Indication for Procedure: 36 yo male with minor saw injury to the left long finger and partial amputation.  Risks and benefits of the procedure were discussed with the patient and informed consent was obtained.    Description of the Findings of the Procedure: The patient was seen in the preoperative holding area and the left hand was marked. The patient was taken to the OR, placed supine on the table, and a padded hand table was used. After monitored anesthesia care was admitted without difficulty, a time-out procedure was performed identifying the patient, the operative site and the procedure to be performed.  10 cc of 1% lidocaine plain and 0.25% bupivacaine was used for a digital block of the left long finger.  A tourniquet was placed on the arm and the right upper extremity was prepped and draped in standard sterile fashion. The upper extremity was exsanguinated with an Esmarch bandage and the tourniquet was inflated to 250 mm Hg.     The wound of the left long finger was explored, cultures were taken.  There was open fractures of the distal phalanx as well as middle phalanx and half of the nail and germinal matrix  amputated.  There was a neurovascular bundle to the ulnar aspect of the finger intact.  The flexor and extensor tendons were intact to the distal phalanx.  Sharp excisional debridement with a 15 blade scalpel was performed of any nonviable skin, subcutaneous tissue and bone.  A 0.045 K-wire was driven retrograde across the distal phalanx fracture the DIP intra-articular fracture was held in reduction in the K-wire was buried subchondrally in the middle phalanx.  The K-wire was truncated underneath the skin.  Integra skin graft substitute was inset radially to the wound with 4-0 chromic.  A bolster using mineral oil soaked cotton ball was used to compress the Integra this was inset with 4-0 chromic.      All instrument, sponge and needle counts were correct.  Adaptic, 4x4, cast padding, a dorsal and volar splint and coban were used to dress the hand. The tourniquet was deflated and the finger was pink and well-perfused.  The patient tolerated the procedure well.  He was taken awake, alert and in good condition to the PACU.    Complications: No    Estimated Blood Loss (EBL): 1 mL           Implants:   Implant Name Type Inv. Item Serial No.  Lot No. LRB No. Used Action   GUIDEWIRE ST SM BNE .[id number] - [id number]  GUIDEWIRE ST SM BNE .[id number]  [employer] [id number] Left 1 Implanted   DRESSING MATRIX BILAYER 2X2 - [id number]  DRESSING MATRIX BILAYER 2X2  [employer] [id number] Left 1 Implanted       Specimens:   Specimen (24h ago, onward)      None                    Condition: Good    Disposition: PACU - hemodynamically stable.    Attestation: I was present and scrubbed for the entire procedure.

## 2025-04-13 LAB — BACTERIA SPEC AEROBE CULT: NORMAL

## 2025-04-14 DIAGNOSIS — S68.113A TRAUMATIC AMPUTATION OF LEFT MIDDLE FINGER: Primary | ICD-10-CM

## 2025-04-14 LAB — FUNGUS SPEC CULT: NORMAL

## 2025-04-15 ENCOUNTER — PATIENT MESSAGE (OUTPATIENT)
Dept: PREADMISSION TESTING | Facility: HOSPITAL | Age: 35
End: 2025-04-15
Payer: COMMERCIAL

## 2025-04-15 LAB — BACTERIA SPEC ANAEROBE CULT: NORMAL

## 2025-04-15 NOTE — ANESTHESIA PAT ROS NOTE
04/15/2025  Nathan Huff is a 35 y.o., male.      Pre-op Assessment    I have reviewed the Patient Summary Reports.       I have reviewed the Medications.     Review of Systems  Anesthesia Hx:  No problems with previous Anesthesia   History of prior surgery of interest to airway management or planning:  Previous anesthesia: MAC       4/10/25 I&D hand with MAC.  Procedure performed at an [facility].     Social:  Former Smoker, No Alcohol Use       Cardiovascular:                hyperlipidemia                               Musculoskeletal:     Traumatic amputation of left middle finger         Spine Disorders: lumbar            Neurological:      Headaches                                 Endocrine:        Obesity / BMI > 30       Past Medical History:   Diagnosis Date    Psoriasiform dermatitis      Past Surgical History:   Procedure Laterality Date    INJECTION N/A 11/01/2024    Lower back    IRRIGATION AND DEBRIDEMENT Left 4/10/2025    Procedure: IRRIGATION AND DEBRIDEMENT;  Surgeon: Miguelina Chicas MD;  Location: [facility] OR;  Service: Orthopedics;  Laterality: Left;  HOLD ABX FOR INTRA OP CULTURES    MOUTH SURGERY      OPEN REDUCTION, FRACTURE, PHALANX, FINGER Left 4/10/2025    Procedure: OPEN REDUCTION, FRACTURE, PHALANX, FINGER;  Surgeon: Miguelina Chicas MD;  Location: [facility] OR;  Service: Orthopedics;  Laterality: Left;    TENOLYSIS, FLEXOR, PALM OR FINGER Left 4/10/2025    Procedure: TENOLYSIS FDP;  Surgeon: Miguelina Chicas MD;  Location: [facility] OR;  Service: Orthopedics;  Laterality: Left;     Anesthesia Assessment: Preoperative EQUATION    Planned Procedure: Procedure(s) (LRB):  APPLICATION, GRAFT, SKIN, FULL-THICKNESS (Left)  IRRIGATION AND DEBRIDEMENT (Left)  Requested Anesthesia Type:Local MAC  Surgeon: Miguelina Chicas MD  Service: Orthopedics  Known or anticipated Date of  Surgery:4/24/2025    Surgeon notes: reviewed    Electronic QUestionnaire Assessment completed via nurse interview with patient.      Triage considerations:     The patient has no apparent active cardiac condition (No unstable coronary Syndrome such as severe unstable angina or recent [<1 month] myocardial infarction, decompensated CHF, severe valvular   disease or significant arrhythmia)    Previous anesthesia records:MAC and No problems    Last PCP note: > 1 year ago , [facility]   Subspecialty notes: Ortho    Other important co-morbidities: HLD and Obesity   EKG 4/7/15  Vent. Rate : 064 BPM     Atrial Rate : 064 BPM      P-R Int : 156 ms          QRS Dur : 100 ms       QT Int : 374 ms       P-R-T Axes : 038 046 037 degrees      QTc Int : 385 ms     Normal sinus rhythm   Early repolarization   Normal ECG   No previous ECGs available   Confirmed by QUYNH TOWNSEND MD ([id number]) on 4/7/2015 5:19:13 PM      Tests already available:  Results have been reviewed.             Instructions given. (See in Nurse's note) Pre op medication instructions sent via portal message. Pt acknowledged understanding.    Optimization:  Anesthesia Preop Clinic Assessment not Indicated    Medical Opinion Indicated: no       Sub-specialist consult indicated: no       Plan:   No pre op medical clearance requested.    Navigation:  Straight Line to surgery.               No tests, anesthesia preop clinic visit, or consult required.    Ht: 6'  Wt: 102.1 kg (225 lb)  BMI: 30.52

## 2025-04-16 ENCOUNTER — TELEPHONE (OUTPATIENT)
Dept: ORTHOPEDICS | Facility: CLINIC | Age: 35
End: 2025-04-16
Payer: COMMERCIAL

## 2025-04-16 NOTE — TELEPHONE ENCOUNTER
Spoke with pt told him that amanda is out and will return Monday to f/u with his workers comp questions .

## 2025-04-22 ENCOUNTER — TELEPHONE (OUTPATIENT)
Dept: ORTHOPEDICS | Facility: CLINIC | Age: 35
End: 2025-04-22
Payer: COMMERCIAL

## 2025-04-22 NOTE — TELEPHONE ENCOUNTER
Spoke to patient regarding follow up.  Rescheduled patient's postop appointment in the morning to a later time.       Hitesh Marsh MS, OTC   Sports Medicine Assistant   [facility]  (P) [phone]  (F) [phone]

## 2025-04-23 ENCOUNTER — OFFICE VISIT (OUTPATIENT)
Dept: ORTHOPEDICS | Facility: CLINIC | Age: 35
End: 2025-04-23
Payer: OTHER MISCELLANEOUS

## 2025-04-23 ENCOUNTER — TELEPHONE (OUTPATIENT)
Dept: ORTHOPEDICS | Facility: CLINIC | Age: 35
End: 2025-04-23
Payer: COMMERCIAL

## 2025-04-23 DIAGNOSIS — S68.113A TRAUMATIC AMPUTATION OF LEFT MIDDLE FINGER: Primary | ICD-10-CM

## 2025-04-23 DIAGNOSIS — S62.639B OPEN FRACTURE OF DISTAL PHALANX OF DIGIT OF LEFT HAND: ICD-10-CM

## 2025-04-23 DIAGNOSIS — Z98.890 POST-OPERATIVE STATE: ICD-10-CM

## 2025-04-23 PROCEDURE — 99999 PR PBB SHADOW E&M-EST. PATIENT-LVL III: CPT | Mod: PBBFAC,,, | Performed by: ORTHOPAEDIC SURGERY

## 2025-04-23 RX ORDER — TRAMADOL HYDROCHLORIDE 50 MG/1
50 TABLET ORAL EVERY 6 HOURS PRN
Qty: 25 TABLET | Refills: 0 | Status: SHIPPED | OUTPATIENT
Start: 2025-04-23

## 2025-04-23 RX ORDER — ONDANSETRON HYDROCHLORIDE 8 MG/1
8 TABLET, FILM COATED ORAL EVERY 8 HOURS PRN
Qty: 25 TABLET | Refills: 0 | Status: SHIPPED | OUTPATIENT
Start: 2025-04-23

## 2025-04-23 NOTE — TELEPHONE ENCOUNTER
Spoke to patient to inform them of their surgery arrival time (930 am), [place], [address] [facility] [address]:  Verbalized understanding of instructions for pre-wash, and reviewed NPO after midnight.   You may drink gatorade and water only up to 2 hours prior to arrival, NOTHING ELSE.  Confirmed call in regards to medication instructions from anesthesia.   Confirmed patient was NOT using a rideshare service for surgery arrival or  transportation.   wife  Confirmed no new wounds or abrasions on operative extremity.      [facility] Pre-Op and PACU Visiting Policy  · Each patient will be allowed 2 visitors with 1 visitor at a time. Only 1 swap out allowed.  · Pediatric patients: Both parents are allowed if present.  · Post-Op: If there is more than 1 visitor, the person that is the main caregiver will need to be available for d/c instructions should visit 2nd, and stay with the patient until d/c.  All visitors must be accompanied in and out with an employee.

## 2025-04-23 NOTE — H&P (VIEW-ONLY)
Nahtan Huff presents for post-operative evaluation of   Encounter Diagnoses   Name Primary?    Post-operative state     Traumatic amputation of left middle finger Yes    Open fracture of distal phalanx of digit of left hand    . The patient is now 13 days s/p 4/10/25 Left long finger Open reduction and pinning, I&D, Kerecis skin graft substitute.  Overall the patient reports doing well.  The patient reports appropriate postoperative soreness with well controlled overall pain.    History of Present Illness              Vitals:    04/23/25 1044   PainSc:   2   PainLoc: Hand       PE:    AA&O x 4.  NAD  HEENT:  NCAT, sclera nonicteric  Lungs:  Respirations are equal and unlabored.  CV:  2+ bilateral upper and lower extremity pulses.  MSK: Physical Exam          Left long finger bolster intact, Stiffness left 2-5 digits, otherwise, full wrist and finger motion.  Neurovascularly intact and has 5/5 thenar and intrinsic musculature strength.       Diagnostic studies and other clinical records review:  In office C-arm xray of left long finger taken today are independently reviewed by me and shows well aligned distal phalanx fracture.             Assessment & Plan: Assessment & Plan             Plan: The patient and I had a thorough discussion today. We discussed the working diagnosis as well as several other potential alternative diagnoses. Treatment options were discussed, both conservative and surgical. Conservative treatment options would include things such as activity modifications, workplace modifications, a period of rest, oral vs topical OTC and prescription anti-inflammatory medications, occupational therapy, splinting/bracing, immobilization, corticosteroid injections, and others. Surgical options were discussed as well.     At this time, the patient has exhausted conservative measures and would like to proceed with surgery. Surgery would include left hypothenar full-thickness skin graft, irrigation and  debridement. Consent signed today in clinic. Light use of the hand will be indicated for the first 4-6 weeks     We have discussed risks, benefits and alternatives of hand surgery which include but are not limited to blood clots in the legs that can travel to the lungs (pulmonary embolism). Pulmonary embolism can cause shortness of breath, chest pain, and even shock. Other risks include urinary tract infection, nausea and vomiting (usually related to pain medication), chronic pain, bleeding, nerve damage, blood vessel injury, scarring and infection of the hand which can require re-operation. Furthermore, the risks of anesthesia include potential heart, lung, kidney, and liver damage.  Informed consent was obtained. he understands and would like to proceed with surgery in the near future.          Miguelina Chicas MD    Please be aware that this note has been generated with the assistance of [email or web address] voice-to-text.  Please excuse any spelling or grammatical errors.  This note was generated with the assistance of ambient listening technology. Verbal consent was obtained by the patient and accompanying visitor(s) for the recording of patient appointment to facilitate this note. I attest to having reviewed and edited the generated note for accuracy, though some syntax or spelling errors may persist. Please contact the author of this note for any clarification.

## 2025-04-23 NOTE — PROGRESS NOTES
Nathan Huff presents for post-operative evaluation of   Encounter Diagnoses   Name Primary?    Post-operative state     Traumatic amputation of left middle finger Yes    Open fracture of distal phalanx of digit of left hand    . The patient is now 13 days s/p 4/10/25 Left long finger Open reduction and pinning, I&D, Kerecis skin graft substitute.  Overall the patient reports doing well.  The patient reports appropriate postoperative soreness with well controlled overall pain.    History of Present Illness              Vitals:    04/23/25 1044   PainSc:   2   PainLoc: Hand       PE:    AA&O x 4.  NAD  HEENT:  NCAT, sclera nonicteric  Lungs:  Respirations are equal and unlabored.  CV:  2+ bilateral upper and lower extremity pulses.  MSK: Physical Exam          Left long finger bolster intact, Stiffness left 2-5 digits, otherwise, full wrist and finger motion.  Neurovascularly intact and has 5/5 thenar and intrinsic musculature strength.       Diagnostic studies and other clinical records review:  In office C-arm xray of left long finger taken today are independently reviewed by me and shows well aligned distal phalanx fracture.             Assessment & Plan: Assessment & Plan             Plan: The patient and I had a thorough discussion today. We discussed the working diagnosis as well as several other potential alternative diagnoses. Treatment options were discussed, both conservative and surgical. Conservative treatment options would include things such as activity modifications, workplace modifications, a period of rest, oral vs topical OTC and prescription anti-inflammatory medications, occupational therapy, splinting/bracing, immobilization, corticosteroid injections, and others. Surgical options were discussed as well.     At this time, the patient has exhausted conservative measures and would like to proceed with surgery. Surgery would include left hypothenar full-thickness skin graft, irrigation and  debridement. Consent signed today in clinic. Light use of the hand will be indicated for the first 4-6 weeks     We have discussed risks, benefits and alternatives of hand surgery which include but are not limited to blood clots in the legs that can travel to the lungs (pulmonary embolism). Pulmonary embolism can cause shortness of breath, chest pain, and even shock. Other risks include urinary tract infection, nausea and vomiting (usually related to pain medication), chronic pain, bleeding, nerve damage, blood vessel injury, scarring and infection of the hand which can require re-operation. Furthermore, the risks of anesthesia include potential heart, lung, kidney, and liver damage.  Informed consent was obtained. he understands and would like to proceed with surgery in the near future.          Mgiuelina Chicas MD    Please be aware that this note has been generated with the assistance of [employer] voice-to-text.  Please excuse any spelling or grammatical errors.  This note was generated with the assistance of ambient listening technology. Verbal consent was obtained by the patient and accompanying visitor(s) for the recording of patient appointment to facilitate this note. I attest to having reviewed and edited the generated note for accuracy, though some syntax or spelling errors may persist. Please contact the author of this note for any clarification.

## 2025-04-24 ENCOUNTER — ANESTHESIA EVENT (OUTPATIENT)
Dept: SURGERY | Facility: HOSPITAL | Age: 35
End: 2025-04-24
Payer: OTHER MISCELLANEOUS

## 2025-04-24 ENCOUNTER — ANESTHESIA (OUTPATIENT)
Dept: SURGERY | Facility: HOSPITAL | Age: 35
End: 2025-04-24
Payer: OTHER MISCELLANEOUS

## 2025-04-24 ENCOUNTER — HOSPITAL ENCOUNTER (OUTPATIENT)
Facility: HOSPITAL | Age: 35
Discharge: HOME | End: 2025-04-24
Attending: ORTHOPAEDIC SURGERY | Admitting: ORTHOPAEDIC SURGERY
Payer: OTHER MISCELLANEOUS

## 2025-04-24 VITALS
HEIGHT: 72 IN | RESPIRATION RATE: 16 BRPM | TEMPERATURE: 99 F | SYSTOLIC BLOOD PRESSURE: 122 MMHG | WEIGHT: 225 LBS | DIASTOLIC BLOOD PRESSURE: 75 MMHG | OXYGEN SATURATION: 96 % | BODY MASS INDEX: 30.48 KG/M2 | HEART RATE: 64 BPM

## 2025-04-24 DIAGNOSIS — S68.113A TRAUMATIC AMPUTATION OF LEFT MIDDLE FINGER: Primary | ICD-10-CM

## 2025-04-24 DIAGNOSIS — M79.642 LEFT HAND PAIN: ICD-10-CM

## 2025-04-24 DIAGNOSIS — S62.639B OPEN FRACTURE OF DISTAL PHALANX OF DIGIT OF LEFT HAND: ICD-10-CM

## 2025-04-24 PROCEDURE — 87116 MYCOBACTERIA CULTURE: CPT | Performed by: ORTHOPAEDIC SURGERY

## 2025-04-24 PROCEDURE — 25000003 PHARM REV CODE 250: Performed by: PHYSICIAN ASSISTANT

## 2025-04-24 PROCEDURE — 25000003 PHARM REV CODE 250

## 2025-04-24 PROCEDURE — 15240 FTH/GFT F/C/C/M/N/AX/G/H/F20: CPT | Mod: 58,,, | Performed by: ORTHOPAEDIC SURGERY

## 2025-04-24 PROCEDURE — 37000008 HC ANESTHESIA 1ST 15 MINUTES: Performed by: ORTHOPAEDIC SURGERY

## 2025-04-24 PROCEDURE — 87102 FUNGUS ISOLATION CULTURE: CPT | Performed by: ORTHOPAEDIC SURGERY

## 2025-04-24 PROCEDURE — 63600175 PHARM REV CODE 636 W HCPCS: Performed by: REGISTERED NURSE

## 2025-04-24 PROCEDURE — 36000706: Performed by: ORTHOPAEDIC SURGERY

## 2025-04-24 PROCEDURE — 87206 SMEAR FLUORESCENT/ACID STAI: CPT | Performed by: ORTHOPAEDIC SURGERY

## 2025-04-24 PROCEDURE — 94761 N-INVAS EAR/PLS OXIMETRY MLT: CPT

## 2025-04-24 PROCEDURE — 37000009 HC ANESTHESIA EA ADD 15 MINS: Performed by: ORTHOPAEDIC SURGERY

## 2025-04-24 PROCEDURE — 63600175 PHARM REV CODE 636 W HCPCS: Performed by: ORTHOPAEDIC SURGERY

## 2025-04-24 PROCEDURE — 71000015 HC POSTOP RECOV 1ST HR: Performed by: ORTHOPAEDIC SURGERY

## 2025-04-24 PROCEDURE — 15275 SKIN SUB GRAFT FACE/NK/HF/G: CPT | Mod: 58,51,, | Performed by: ORTHOPAEDIC SURGERY

## 2025-04-24 PROCEDURE — 87205 SMEAR GRAM STAIN: CPT | Performed by: ORTHOPAEDIC SURGERY

## 2025-04-24 PROCEDURE — 87070 CULTURE OTHR SPECIMN AEROBIC: CPT | Performed by: ORTHOPAEDIC SURGERY

## 2025-04-24 PROCEDURE — 36000707: Performed by: ORTHOPAEDIC SURGERY

## 2025-04-24 PROCEDURE — 63600175 PHARM REV CODE 636 W HCPCS

## 2025-04-24 PROCEDURE — 99900035 HC TECH TIME PER 15 MIN (STAT)

## 2025-04-24 PROCEDURE — 25000003 PHARM REV CODE 250: Performed by: ORTHOPAEDIC SURGERY

## 2025-04-24 PROCEDURE — 25000003 PHARM REV CODE 250: Performed by: REGISTERED NURSE

## 2025-04-24 PROCEDURE — 71000033 HC RECOVERY, INTIAL HOUR: Performed by: ORTHOPAEDIC SURGERY

## 2025-04-24 DEVICE — FISH-SKIN GRAFT FOR WOUND MANAGEMENT. KERECIS® MARIGEN™ IS INDICATED FOR THE MANAGEMENT OF WOUNDS INCLUDING: PARTIAL AND FULL-THICKNESS WOUNDS, PRESSURE ULCERS, VENOUS ULCERS, CHRONIC VASCULAR ULCERS, DIABETIC ULCERS, TRAUMA WOUNDS (ABRASIONS, LACERATIONS, SECOND-DEGREE BURNS, SKIN TEARS), SURGICAL WOUNDS (DONOR SITES/GRAFTS, POST-MOHS SURGERY, POST-LASER SURGERY, PODIATRIC, WOUND DEHISCENCE) AND DRAINING WOUNDS.HTTPS://WWW.KERECIS.COM/IFUS/IFU-KERECIS-MARIGEN/
Type: IMPLANTABLE DEVICE | Site: FINGER | Status: FUNCTIONAL
Brand: KERECIS® MARIGEN™

## 2025-04-24 RX ORDER — PROPOFOL 10 MG/ML
VIAL (ML) INTRAVENOUS
Status: DISCONTINUED | OUTPATIENT
Start: 2025-04-24 | End: 2025-04-24

## 2025-04-24 RX ORDER — FENTANYL CITRATE 50 UG/ML
25 INJECTION, SOLUTION INTRAMUSCULAR; INTRAVENOUS EVERY 5 MIN PRN
Status: DISCONTINUED | OUTPATIENT
Start: 2025-04-24 | End: 2025-04-24 | Stop reason: HOSPADM

## 2025-04-24 RX ORDER — BUPIVACAINE HYDROCHLORIDE 2.5 MG/ML
INJECTION, SOLUTION EPIDURAL; INFILTRATION; INTRACAUDAL; PERINEURAL
Status: DISCONTINUED | OUTPATIENT
Start: 2025-04-24 | End: 2025-04-24 | Stop reason: HOSPADM

## 2025-04-24 RX ORDER — HALOPERIDOL LACTATE 5 MG/ML
0.5 INJECTION, SOLUTION INTRAMUSCULAR EVERY 10 MIN PRN
Status: DISCONTINUED | OUTPATIENT
Start: 2025-04-24 | End: 2025-04-24 | Stop reason: HOSPADM

## 2025-04-24 RX ORDER — CELECOXIB 200 MG/1
400 CAPSULE ORAL
Status: COMPLETED | OUTPATIENT
Start: 2025-04-24 | End: 2025-04-24

## 2025-04-24 RX ORDER — MUPIROCIN 20 MG/G
OINTMENT TOPICAL 2 TIMES DAILY
Status: DISCONTINUED | OUTPATIENT
Start: 2025-04-24 | End: 2025-04-24 | Stop reason: HOSPADM

## 2025-04-24 RX ORDER — MIDAZOLAM HYDROCHLORIDE 1 MG/ML
INJECTION INTRAMUSCULAR; INTRAVENOUS
Status: DISCONTINUED | OUTPATIENT
Start: 2025-04-24 | End: 2025-04-24

## 2025-04-24 RX ORDER — ACETAMINOPHEN 500 MG
1000 TABLET ORAL
Status: COMPLETED | OUTPATIENT
Start: 2025-04-24 | End: 2025-04-24

## 2025-04-24 RX ORDER — GLUCAGON 1 MG
1 KIT INJECTION
Status: DISCONTINUED | OUTPATIENT
Start: 2025-04-24 | End: 2025-04-24 | Stop reason: HOSPADM

## 2025-04-24 RX ORDER — LORAZEPAM 2 MG/ML
0.25 INJECTION INTRAMUSCULAR ONCE AS NEEDED
Status: DISCONTINUED | OUTPATIENT
Start: 2025-04-24 | End: 2025-04-24 | Stop reason: HOSPADM

## 2025-04-24 RX ORDER — FENTANYL CITRATE 50 UG/ML
INJECTION, SOLUTION INTRAMUSCULAR; INTRAVENOUS
Status: DISCONTINUED | OUTPATIENT
Start: 2025-04-24 | End: 2025-04-24

## 2025-04-24 RX ORDER — LIDOCAINE HYDROCHLORIDE 10 MG/ML
INJECTION, SOLUTION EPIDURAL; INFILTRATION; INTRACAUDAL; PERINEURAL
Status: DISCONTINUED | OUTPATIENT
Start: 2025-04-24 | End: 2025-04-24 | Stop reason: HOSPADM

## 2025-04-24 RX ORDER — DIPHENHYDRAMINE HYDROCHLORIDE 50 MG/ML
25 INJECTION, SOLUTION INTRAMUSCULAR; INTRAVENOUS EVERY 6 HOURS PRN
Status: DISCONTINUED | OUTPATIENT
Start: 2025-04-24 | End: 2025-04-24 | Stop reason: HOSPADM

## 2025-04-24 RX ORDER — ONDANSETRON HYDROCHLORIDE 2 MG/ML
INJECTION, SOLUTION INTRAVENOUS
Status: DISCONTINUED | OUTPATIENT
Start: 2025-04-24 | End: 2025-04-24

## 2025-04-24 RX ORDER — CEFAZOLIN 2 G/1
2 INJECTION, POWDER, FOR SOLUTION INTRAMUSCULAR; INTRAVENOUS
Status: COMPLETED | OUTPATIENT
Start: 2025-04-24 | End: 2025-04-24

## 2025-04-24 RX ORDER — MUPIROCIN 20 MG/G
OINTMENT TOPICAL
Status: DISCONTINUED | OUTPATIENT
Start: 2025-04-24 | End: 2025-04-24 | Stop reason: HOSPADM

## 2025-04-24 RX ORDER — BACITRACIN ZINC 500 UNIT/G
OINTMENT (GRAM) TOPICAL
Status: DISCONTINUED | OUTPATIENT
Start: 2025-04-24 | End: 2025-04-24 | Stop reason: HOSPADM

## 2025-04-24 RX ORDER — SULFAMETHOXAZOLE AND TRIMETHOPRIM 800; 160 MG/1; MG/1
1 TABLET ORAL 2 TIMES DAILY
Qty: 14 TABLET | Refills: 0 | Status: SHIPPED | OUTPATIENT
Start: 2025-04-24 | End: 2025-05-01

## 2025-04-24 RX ORDER — HYDROCODONE BITARTRATE AND ACETAMINOPHEN 5; 325 MG/1; MG/1
1 TABLET ORAL EVERY 4 HOURS PRN
Status: DISCONTINUED | OUTPATIENT
Start: 2025-04-24 | End: 2025-04-24 | Stop reason: HOSPADM

## 2025-04-24 RX ORDER — LIDOCAINE HYDROCHLORIDE 20 MG/ML
INJECTION INTRAVENOUS
Status: DISCONTINUED | OUTPATIENT
Start: 2025-04-24 | End: 2025-04-24

## 2025-04-24 RX ADMIN — PROPOFOL 50 MG: 10 INJECTION, EMULSION INTRAVENOUS at 11:04

## 2025-04-24 RX ADMIN — FENTANYL CITRATE 25 MCG: 50 INJECTION, SOLUTION INTRAMUSCULAR; INTRAVENOUS at 11:04

## 2025-04-24 RX ADMIN — PROPOFOL 150 MCG/KG/MIN: 10 INJECTION, EMULSION INTRAVENOUS at 11:04

## 2025-04-24 RX ADMIN — MIDAZOLAM HYDROCHLORIDE 2 MG: 2 INJECTION, SOLUTION INTRAMUSCULAR; INTRAVENOUS at 11:04

## 2025-04-24 RX ADMIN — FENTANYL CITRATE 25 MCG: 50 INJECTION, SOLUTION INTRAMUSCULAR; INTRAVENOUS at 12:04

## 2025-04-24 RX ADMIN — LIDOCAINE HYDROCHLORIDE 25 MG: 20 INJECTION INTRAVENOUS at 11:04

## 2025-04-24 RX ADMIN — ACETAMINOPHEN 1000 MG: 500 TABLET ORAL at 09:04

## 2025-04-24 RX ADMIN — CELECOXIB 400 MG: 200 CAPSULE ORAL at 09:04

## 2025-04-24 RX ADMIN — FENTANYL CITRATE 25 MCG: 50 INJECTION, SOLUTION INTRAMUSCULAR; INTRAVENOUS at 01:04

## 2025-04-24 RX ADMIN — ONDANSETRON 4 MG: 2 INJECTION INTRAMUSCULAR; INTRAVENOUS at 12:04

## 2025-04-24 RX ADMIN — MUPIROCIN: 20 OINTMENT TOPICAL at 09:04

## 2025-04-24 RX ADMIN — CEFAZOLIN 2 G: 2 INJECTION, POWDER, FOR SOLUTION INTRAMUSCULAR; INTRAVENOUS at 11:04

## 2025-04-24 RX ADMIN — SODIUM CHLORIDE: 9 INJECTION, SOLUTION INTRAVENOUS at 01:04

## 2025-04-24 RX ADMIN — SODIUM CHLORIDE: 9 INJECTION, SOLUTION INTRAVENOUS at 11:04

## 2025-04-24 NOTE — ANESTHESIA PREPROCEDURE EVALUATION
04/24/2025  Nathan Huff is a 35 y.o., male.      Pre-op Assessment    I have reviewed the Patient Summary Reports.     I have reviewed the Nursing Notes. I have reviewed the NPO Status.   I have reviewed the Medications.     Review of Systems  Anesthesia Hx:  No problems with previous Anesthesia   History of prior surgery of interest to airway management or planning:  Previous anesthesia: General        Denies Family Hx of Anesthesia complications.    Denies Personal Hx of Anesthesia complications.                    Social:  Former Smoker       Hematology/Oncology:  Hematology Normal   Oncology Normal                                   EENT/Dental:  EENT/Dental Normal           Cardiovascular:  Exercise tolerance: good              hyperlipidemia                               Pulmonary:  Pulmonary Normal                       Renal/:  Renal/ Normal                 Hepatic/GI:  Hepatic/GI Normal                    Musculoskeletal:  Musculoskeletal Normal                Neurological:      Headaches     Migraines                            Endocrine:  Endocrine Normal            Dermatological:  Skin Normal    Psych:  Psychiatric Normal                    Physical Exam  General: Well nourished, Cooperative, Alert and Oriented    Airway:  Mallampati: III / II  Mouth Opening: Normal  TM Distance: Normal  Tongue: Normal  Neck ROM: Normal ROM    Dental:  Intact    Chest/Lungs:  Clear to auscultation, Normal Respiratory Rate    Heart:  Rate: Normal  Rhythm: Regular Rhythm  Sounds: Normal    Abdomen:  Normal, Soft, Nontender        Anesthesia Plan  Type of Anesthesia, risks & benefits discussed:    Anesthesia Type: Gen Supraglottic Airway, Gen Natural Airway, MAC, Gen ETT  Intra-op Monitoring Plan: Standard ASA Monitors  Post Op Pain Control Plan: multimodal analgesia  Induction:  IV  Airway Plan: Direct,  Post-Induction  Informed Consent: Informed consent signed with the Patient and all parties understand the risks and agree with anesthesia plan.  All questions answered.   ASA Score: 2    Ready For Surgery From Anesthesia Perspective.     .

## 2025-04-24 NOTE — PLAN OF CARE
VSS.  Patient tolerating oral liquids without difficulty.   No apparent s&s of distress noted at this time, no complaints voiced at this time.   Discharge instructions reviewed with patient/family/friend with good verbal feedback received.   Post op medications delivered at bedside..  Patient ready for discharge.

## 2025-04-24 NOTE — DISCHARGE SUMMARY
[place] - Surgery (Hospital)  Discharge Note  Short Stay    Procedure(s) (LRB):  APPLICATION, GRAFT, SKIN, FULL-THICKNESS (Left)  IRRIGATION AND DEBRIDEMENT (Left)  APPLICATION, ACELLULAR HUMAN DERMAL ALLOGRAFT (Left)      OUTCOME: Patient tolerated treatment/procedure well without complication and is now ready for discharge.    DISPOSITION: Home or Self Care    FINAL DIAGNOSIS:  open fracture left long distal phalanx and partial amputation    FOLLOWUP: In clinic    DISCHARGE INSTRUCTIONS:    Discharge Procedure Orders   Diet general     Keep surgical extremity elevated     Ice to affected area     Lifting restrictions     No driving, operating heavy equipment or signing legal documents while taking pain medication.     Leave dressing on - Keep it clean, dry, and intact until clinic visit     Call MD for:  temperature >100.4     Call MD for:  persistent nausea and vomiting     Call MD for:  severe uncontrolled pain     Call MD for:  difficulty breathing, headache or visual disturbances     Call MD for:  redness, tenderness, or signs of infection (pain, swelling, redness, odor or green/yellow discharge around incision site)     Call MD for:  hives     Call MD for:  persistent dizziness or light-headedness     Call MD for:  extreme fatigue

## 2025-04-24 NOTE — TRANSFER OF CARE
Anesthesia Transfer of Care Note    Patient: Nathan Huff    Procedure(s) Performed: Procedure(s) (LRB):  APPLICATION, GRAFT, SKIN, FULL-THICKNESS (Left)  IRRIGATION AND DEBRIDEMENT (Left)    Patient location: PACU    Anesthesia Type: general    Transport from OR: Transported from OR on room air with adequate spontaneous ventilation    Post pain: adequate analgesia    Post assessment: no apparent anesthetic complications and tolerated procedure well    Post vital signs: stable    Level of consciousness: awake and alert    Nausea/Vomiting: no nausea/vomiting    Complications: none    Transfer of care protocol was followedComments: Nurse at bedside, VSS, spont reg resp noted      Last vitals: Visit Vitals  /72   Pulse 80   Temp 36.4 °C (97.6 °F) (Temporal)   Resp 16   Ht 6' (1.829 m)   Wt 102.1 kg (225 lb)   SpO2 97%   BMI 30.52 kg/m²

## 2025-04-24 NOTE — DISCHARGE INSTRUCTIONS
HAND SURGERY - Care of the Hand after Surgery       Post-Op Care  It is important to follow your orthopaedic surgeon's instructions carefully after you return home. You should ask   someone to check on you that evening. The protocols described here are general in nature. Every person and   every surgery is different so the information given here is for guidance only. If you have questions you should   contact us.     Day of Surgery    You were place in a plaster splint today to protect the surgical area during healing. Do not remove the plaster splint until you are seen by Occupational Therapy, Abril Lemons PA-C or Dr. Chicas for your first postop visit    The hand and fingers may be numb for quite some time after surgery. This is due to the anesthetic block used at the time of surgery for pain control.   If you have an arm sling you may remove the sling at your convenience once you regain control of your arm from the anesthetic block.     Begin taking liquids and food as soon as you can. You should always eat some solid food, a sandwich or light meal, a little while before taking your pain meds.     Day 3  Things are much the same on the third day after your surgery. Usually you have less pain and feel like doing more.    Showering: It is important to keep the incision absolutely dry while showering or bathing (use two plastic bags over your hand) or a shower bag.   If you feel that the pain medication you were given after surgery is stronger than you really need you can reduce the dose, take it less frequently or switch to ibuprofen or Tylenol. If you received antibiotics they should be taken until the entire prescription is completed.    TBD    Occupational Therapy will see you between this time. Please let us know if you are not scheduled [phone]    Day 13  We will see you back after your surgery to review with you what was done in surgery and will talk about rehab and answer any questions you may have.  "      HAND SURGERY  Driving: You can drive if you are comfortable and have regained full finger movements and if you have sufficient power to control the vehicle.   Return to work: Timing of your return to work is variable according to your occupation and specific surgery. We should discuss this at your follow up visit if not already discussed prior.  Elevation: Hand elevation is important to prevent swelling and stiffness of the fingers. One minute of leaving your hand dangling negates four hours of keeping it elevated. Please remember not to walk with your hand hanging down or to sit with your hand resting in your lap. It is fine, however, to lower your hand for light use and you should get back to normal light activities as soon as possible as guided by common sense.         HAND SURGERY - Common Concerns and Frequently Asked Questions    When to Call the Doctor  Pain, burning, or numbness of the fingers or the back of the hand not relieved by elevation of the arm  Pale or cold finger; bluish nail beds  Red line or streak going up the arm  Excessive swelling  Fever over 100.3ºF  Pain unrelieved by pain medication    Tips for one armed living  It helps to have...   In the shower   Plastic bags and rubber bands to cover bandages - the bags that newspapers come in are good to cover the hand and wrist. Otherwise small trash can liners will do. Use two at a time.   Bottle sponge (soft sponge on a long stick) - for the armpit of your "good" hand.   Shower brush   A hair brush in the shower will help you to wash your hair.   Cotton indigo cloth bathrobe - to dry your back.    In the bathroom   Toothpaste, shampoo, etc. in flip-top or pump (not screw top) dispensers.   Consider an electric razor.   Flossers (dental floss on a "Y" shaped handle).    In the kitchen   Dycem mat (rubber jar opener mat) - to help open jars, but also keep things from sliding around while you are working on them.    Double suction cup pads " "("little Octopus") - to hold items while you use or wash them.   Electric can opener with a lid magnet strong enough to hold the can in the air - for one handed use.   In the bedroom   Back scratcher.   Large sleeve shirts and tops.   Put away clothing which buttons, fastens or snaps in the back or which uses drawstrings.    Sports bra or a camisole instead of a bra.   [employer] nylons can be pulled on one handed - most others can't.   A "wash and wear" haircut.     "

## 2025-04-24 NOTE — BRIEF OP NOTE
[place] - Surgery ([facility])  Surgery Department  Operative Note    SUMMARY     Date of Procedure: 4/24/2025     Procedure: Procedure(s) (LRB):  APPLICATION, GRAFT, SKIN, FULL-THICKNESS (Left)  IRRIGATION AND DEBRIDEMENT (Left)  APPLICATION, ACELLULAR HUMAN DERMAL ALLOGRAFT (Left)     Surgeons and Role:     * Miguelina Chicas MD - Primary    Assisting Surgeon: None    Pre-Operative Diagnosis: Traumatic amputation of left middle finger [S68.113A]    Post-Operative Diagnosis: Post-Op Diagnosis Codes:     * Traumatic amputation of left middle finger [S68.113A]    Anesthesia: Local MAC    Estimated Blood Loss (EBL): minimal           Implants:   Implant Name Type Inv. Item Serial No.  Lot No. LRB No. Used Action   GRAFT [employer] [facility] SKN [id number] - [id number]  GRAFT [employer] [facility] SKN [id number]   [id number] Left 1 Implanted       Specimens:   Specimen (24h ago, onward)      None           ID Type Source Tests Collected by Time Destination   A : LEFT MIDDLE FINGER Wound Finger, Left Hand CULTURE, FUNGUS, GRAM STAIN, CULTURE, AEROBIC  (SPECIFY SOURCE), AFB CULTURE & SMEAR Miguelina Chicas MD 4/24/2025 1148               Condition: Good    Disposition: PACU - hemodynamically stable.    Attestation: Op Note Attestation: I was physically present and scrubbed for the entire procedure.

## 2025-04-24 NOTE — PLAN OF CARE
Pre op complete. Waiting on anesthesia consent. Pt's wife at bedside; she will hold pt belongings. Pt resting comfortably with all questions addressed at this time and call light in reach.

## 2025-04-25 LAB
ACID FAST MOD KINY STN SPEC: NORMAL
GRAM STN SPEC: NORMAL
GRAM STN SPEC: NORMAL

## 2025-04-25 NOTE — ANESTHESIA POSTPROCEDURE EVALUATION
Anesthesia Post Evaluation    Patient: Nathan Huff    Procedure(s) Performed: Procedure(s) (LRB):  APPLICATION, GRAFT, SKIN, FULL-THICKNESS (Left)  IRRIGATION AND DEBRIDEMENT (Left)  APPLICATION, ACELLULAR HUMAN DERMAL ALLOGRAFT (Left)    Final Anesthesia Type: general      Patient location during evaluation: PACU  Patient participation: Yes- Able to Participate  Level of consciousness: awake and alert and oriented  Post-procedure vital signs: reviewed and stable  Pain management: adequate  Airway patency: patent    PONV status at discharge: No PONV  Anesthetic complications: no      Cardiovascular status: hemodynamically stable  Respiratory status: unassisted, spontaneous ventilation and room air  Hydration status: euvolemic  Follow-up not needed.              Vitals Value Taken Time   /75 04/24/25 14:17   Temp 37.1 °C (98.7 °F) 04/24/25 15:00   Pulse 69 04/24/25 14:25   Resp 26 04/24/25 14:25   SpO2 96 % 04/24/25 14:25   Vitals shown include unfiled device data.      Event Time   Out of Recovery 14:12:00         Pain/Bryan Score: Pain Rating Prior to Med Admin: 1 (4/24/2025  9:47 AM)  Bryan Score: 9 (4/24/2025  1:43 PM)

## 2025-04-25 NOTE — OP NOTE
[facility] Surgery [facility])  Surgery Department  Operative Note    SUMMARY     Date of Procedure: 4/24/2025     Procedure:   1.  Left long finger skin grafting with left hand hypothenar full-thickness harvest, cpt 90764  2.  Left long finger irrigation and excisional debridement skin, subcuatenous tissue, cpt 39506  3.  Skin graft substitute application left long finger, cpt 63194    Surgeons and Role:     * Miguelina Chicas MD - Primary    Assisting Surgeon: None    Pre-Operative Diagnosis: Traumatic amputation of left middle finger [S68.113A]    Post-Operative Diagnosis: Post-Op Diagnosis Codes:     * Traumatic amputation of left middle finger [S68.113A]    Anesthesia: Local MAC     Indication for Procedure:  34 yo male with minor saw injury to the left long finger and partial amputation.  Patient previously underwent open reduction internal fixation, debridement as well as Integra application.  Risks and benefits of the procedure were discussed with the patient and informed consent was obtained.      Description of the Findings of the Procedure: The patient was seen in the preoperative holding area and the left hand was marked. The patient was taken to the OR, placed supine on the table, and a padded hand table was used. After monitored anesthesia care was admitted without difficulty, a time-out procedure was performed identifying the patient, the operative site and the procedure to be performed.  18 cc of 1% lidocaine plain and 0.25% bupivacaine was used for a digital block of the left long finger and hand.  A tourniquet was placed on the arm and the upper extremity was prepped and draped in standard sterile fashion. The upper extremity was exsanguinated with an Esmarch bandage and the tourniquet was inflated to 250 mm Hg.      The wound of the left long finger Integra plastic cover was removed, cultures were taken.  There was some fibrinous tissue in the wound as well as healthy granulation tissue of the graft  site and closure of the DIP joint.  Sharp excisional debridement with a 15 blade scalpel was performed of any nonviable skin, subcutaneous tissue.  The defect was measured and a 6 x 2 cm hypothenar full-thickness skin graft was drawn on the ulnar side of the hand.  This graft was sharply elevated with a 15 blade scalpel and defatted.  The donor site was irrigated and closed with 4-0 Vicryl deep as well as 3-0 Prolene in a horizontal mattress fashion.  The full thickness skin graft inset into the long finger wound 4-0 chromic.  Kerecis skin graft substitute was used to cover a 1 x 0.5 cm area of pulp distally which was not covered with the full-thickness skin graft.  A bolster using mineral oil soaked cotton ball was used to compress the graft this was inset with 4-0 chromic.       All instrument, sponge and needle counts were correct.  Adaptic, 4x4, cast padding, a dorsal and volar splint and coban were used to dress the hand. The tourniquet was deflated and the finger was pink and well-perfused.  The patient tolerated the procedure well.  He was taken awake, alert and in good condition to the PACU.    Post op plans patient keep dressing clean dry intact. Will see the patient back in 10-14 days to remove bolster, dressing and sutures. Light use of the hand for 6 weeks.    Complications: No    Estimated Blood Loss (EBL): minimal           Implants:   Implant Name Type Inv. Item Serial No.  Lot No. LRB No. Used Action   GRAFT [facility] [id number] - [id number]  GRAFT [facility] 1.75X175   [id number] Left 1 Implanted       Specimens:   Specimen (24h ago, onward)      None                    Condition: Good    Disposition: PACU - hemodynamically stable.    Attestation: I was present and scrubbed for the entire procedure.

## 2025-04-28 LAB
BACTERIA SPEC AEROBE CULT: ABNORMAL
FUNGUS SPEC CULT: NORMAL

## 2025-04-30 DIAGNOSIS — S62.639B OPEN FRACTURE OF DISTAL PHALANX OF DIGIT OF LEFT HAND: ICD-10-CM

## 2025-04-30 DIAGNOSIS — S68.113A TRAUMATIC AMPUTATION OF LEFT MIDDLE FINGER: ICD-10-CM

## 2025-04-30 NOTE — TELEPHONE ENCOUNTER
Called patient regarding intra op cx taken 4/25/25 Moderate Methicillin resistant Staphylococcus aureus.     Sent refill request to provider  Sent message to ID to establish care.    Hitesh Marsh MS, OTC   Sports Medicine Assistant   [facility]  (P) [phone]  (F) [phone]

## 2025-05-01 ENCOUNTER — TELEPHONE (OUTPATIENT)
Dept: ORTHOPEDICS | Facility: CLINIC | Age: 35
End: 2025-05-01
Payer: COMMERCIAL

## 2025-05-01 RX ORDER — SULFAMETHOXAZOLE AND TRIMETHOPRIM 800; 160 MG/1; MG/1
1 TABLET ORAL 2 TIMES DAILY
Qty: 14 TABLET | Refills: 0 | Status: SHIPPED | OUTPATIENT
Start: 2025-05-01 | End: 2025-05-08

## 2025-05-01 RX ORDER — SULFAMETHOXAZOLE AND TRIMETHOPRIM 800; 160 MG/1; MG/1
1 TABLET ORAL 2 TIMES DAILY
Qty: 14 TABLET | Refills: 0 | OUTPATIENT
Start: 2025-05-01 | End: 2025-05-08

## 2025-05-01 NOTE — TELEPHONE ENCOUNTER
Confirmed rx abx sent by Dr. Juan Francisco Marsh, MS, OTC   Sports Medicine Assistant   [facility]  (P) [phone]  (F) [phone]

## 2025-05-01 NOTE — TELEPHONE ENCOUNTER
Patient communication:  - increase dose of bactrim 2 tabs BID.     Hitesh Marsh MS, OTC   Sports Medicine Assistant   [facility]  (P) [phone]  (F) [phone]

## 2025-05-05 ENCOUNTER — LAB VISIT (OUTPATIENT)
Dept: LAB | Facility: HOSPITAL | Age: 35
End: 2025-05-05
Attending: STUDENT IN AN ORGANIZED HEALTH CARE EDUCATION/TRAINING PROGRAM
Payer: OTHER MISCELLANEOUS

## 2025-05-05 ENCOUNTER — OFFICE VISIT (OUTPATIENT)
Dept: INFECTIOUS DISEASES | Facility: CLINIC | Age: 35
End: 2025-05-05

## 2025-05-05 ENCOUNTER — RESULTS FOLLOW-UP (OUTPATIENT)
Dept: INFECTIOUS DISEASES | Facility: CLINIC | Age: 35
End: 2025-05-05

## 2025-05-05 VITALS
HEART RATE: 59 BPM | DIASTOLIC BLOOD PRESSURE: 79 MMHG | BODY MASS INDEX: 31.07 KG/M2 | WEIGHT: 229.06 LBS | SYSTOLIC BLOOD PRESSURE: 128 MMHG | TEMPERATURE: 98 F

## 2025-05-05 DIAGNOSIS — S68.113A TRAUMATIC AMPUTATION OF LEFT MIDDLE FINGER: ICD-10-CM

## 2025-05-05 DIAGNOSIS — S62.639B OPEN FRACTURE OF DISTAL PHALANX OF DIGIT OF LEFT HAND: ICD-10-CM

## 2025-05-05 DIAGNOSIS — S68.113A TRAUMATIC AMPUTATION OF LEFT MIDDLE FINGER: Primary | ICD-10-CM

## 2025-05-05 LAB
ABSOLUTE EOSINOPHIL (OHS): 0.22 K/UL
ABSOLUTE MONOCYTE (OHS): 0.48 K/UL (ref 0.3–1)
ABSOLUTE NEUTROPHIL COUNT (OHS): 3.05 K/UL (ref 1.8–7.7)
ALBUMIN SERPL BCP-MCNC: 4.4 G/DL (ref 3.5–5.2)
ALP SERPL-CCNC: 91 UNIT/L (ref 40–150)
ALT SERPL W/O P-5'-P-CCNC: 32 UNIT/L (ref 10–44)
ANION GAP (OHS): 8 MMOL/L (ref 8–16)
AST SERPL-CCNC: 20 UNIT/L (ref 11–45)
BASOPHILS # BLD AUTO: 0.05 K/UL
BASOPHILS NFR BLD AUTO: 0.8 %
BILIRUB SERPL-MCNC: 0.7 MG/DL (ref 0.1–1)
BUN SERPL-MCNC: 13 MG/DL (ref 6–20)
CALCIUM SERPL-MCNC: 9.7 MG/DL (ref 8.7–10.5)
CHLORIDE SERPL-SCNC: 105 MMOL/L (ref 95–110)
CO2 SERPL-SCNC: 27 MMOL/L (ref 23–29)
CREAT SERPL-MCNC: 0.9 MG/DL (ref 0.5–1.4)
CRP SERPL-MCNC: 2.4 MG/L
ERYTHROCYTE [DISTWIDTH] IN BLOOD BY AUTOMATED COUNT: 12.1 % (ref 11.5–14.5)
GFR SERPLBLD CREATININE-BSD FMLA CKD-EPI: >60 ML/MIN/1.73/M2
GLUCOSE SERPL-MCNC: 93 MG/DL (ref 70–110)
HCT VFR BLD AUTO: 47.1 % (ref 40–54)
HGB BLD-MCNC: 15.8 GM/DL (ref 14–18)
IMM GRANULOCYTES # BLD AUTO: 0.02 K/UL (ref 0–0.04)
IMM GRANULOCYTES NFR BLD AUTO: 0.3 % (ref 0–0.5)
LYMPHOCYTES # BLD AUTO: 2.28 K/UL (ref 1–4.8)
MCH RBC QN AUTO: 30.2 PG (ref 27–31)
MCHC RBC AUTO-ENTMCNC: 33.5 G/DL (ref 32–36)
MCV RBC AUTO: 90 FL (ref 82–98)
NUCLEATED RBC (/100WBC) (OHS): 0 /100 WBC
PLATELET # BLD AUTO: 242 K/UL (ref 150–450)
PMV BLD AUTO: 10.6 FL (ref 9.2–12.9)
POTASSIUM SERPL-SCNC: 5.3 MMOL/L (ref 3.5–5.1)
PROT SERPL-MCNC: 8.2 GM/DL (ref 6–8.4)
RBC # BLD AUTO: 5.24 M/UL (ref 4.6–6.2)
RELATIVE EOSINOPHIL (OHS): 3.6 %
RELATIVE LYMPHOCYTE (OHS): 37.4 % (ref 18–48)
RELATIVE MONOCYTE (OHS): 7.9 % (ref 4–15)
RELATIVE NEUTROPHIL (OHS): 50 % (ref 38–73)
SODIUM SERPL-SCNC: 140 MMOL/L (ref 136–145)
WBC # BLD AUTO: 6.1 K/UL (ref 3.9–12.7)

## 2025-05-05 PROCEDURE — 86140 C-REACTIVE PROTEIN: CPT

## 2025-05-05 PROCEDURE — 85025 COMPLETE CBC W/AUTO DIFF WBC: CPT

## 2025-05-05 PROCEDURE — 80053 COMPREHEN METABOLIC PANEL: CPT

## 2025-05-05 PROCEDURE — 36415 COLL VENOUS BLD VENIPUNCTURE: CPT

## 2025-05-05 PROCEDURE — 99999 PR PBB SHADOW E&M-EST. PATIENT-LVL III: CPT | Mod: PBBFAC,,, | Performed by: STUDENT IN AN ORGANIZED HEALTH CARE EDUCATION/TRAINING PROGRAM

## 2025-05-05 RX ORDER — SULFAMETHOXAZOLE AND TRIMETHOPRIM 800; 160 MG/1; MG/1
2 TABLET ORAL 2 TIMES DAILY
Qty: 124 TABLET | Refills: 0 | Status: SHIPPED | OUTPATIENT
Start: 2025-05-05 | End: 2025-05-05 | Stop reason: SINTOL

## 2025-05-05 RX ORDER — DOXYCYCLINE 100 MG/1
100 CAPSULE ORAL EVERY 12 HOURS
Qty: 62 CAPSULE | Refills: 0 | Status: SHIPPED | OUTPATIENT
Start: 2025-05-05 | End: 2025-05-07

## 2025-05-05 NOTE — PROGRESS NOTES
Infectious Diseases Progress Note  Subjective:     Chief Complaint: MRSA infection     HPI: Nathan Huff is a 35 y.o. male referred to ID clinic for MRSA in surgical cultures. Had traumatic amputation of the tip of his left third finger with saw 4/7/2025, underwent ORIF with Dr. Chicas on 4/10. Surgical cultures from 4/10 negative. Subsequently underwent skin graft 4/24. Surgical cultures obtained, growing MRSA. Was given bactrim 4/24 and prescribed another 7 day course 5/1. He has a faint rash on his L arm proximal to his hand dressing. Not present now. Denies nausea, vomiting, diarrhea, rash itching. He is not currently working, but does spend a fair amount of time outside. No other medical issues.       Immunization History   Administered Date(s) Administered    Tdap 04/07/2025        Review of Systems   HENT:  Positive for tinnitus.    Skin:  Positive for rash.   Musculoskeletal:  Positive for back pain and joint pain.   Gastrointestinal:  Positive for abdominal pain.   Neurological:  Positive for headaches.   Psychiatric/Behavioral:  The patient has insomnia.         Past Medical History:   Diagnosis Date    Psoriasiform dermatitis      Past Surgical History:   Procedure Laterality Date    INJECTION N/A 11/01/2024    Lower back    IRRIGATION AND DEBRIDEMENT Left 4/10/2025    Procedure: IRRIGATION AND DEBRIDEMENT;  Surgeon: Miguelina Chicas MD;  Location: [facility] OR;  Service: Orthopedics;  Laterality: Left;  HOLD ABX FOR INTRA OP CULTURES    IRRIGATION AND DEBRIDEMENT Left 4/24/2025    Procedure: IRRIGATION AND DEBRIDEMENT;  Surgeon: Miguelina Chicas MD;  Location: [facility] OR;  Service: Orthopedics;  Laterality: Left;    MOUTH SURGERY      OPEN REDUCTION, FRACTURE, PHALANX, FINGER Left 4/10/2025    Procedure: OPEN REDUCTION, FRACTURE, PHALANX, FINGER;  Surgeon: Miguelina Chicas MD;  Location: [facility] OR;  Service: Orthopedics;  Laterality: Left;    PLACEMENT OF ACELLULAR HUMAN DERMAL ALLOGRAFT Left 4/24/2025     "Procedure: APPLICATION, ACELLULAR HUMAN DERMAL ALLOGRAFT;  Surgeon: Miguelina Chicas MD;  Location: [facility] OR;  Service: Orthopedics;  Laterality: Left;    SKIN FULL THICKNESS GRAFT Left 4/24/2025    Procedure: APPLICATION, GRAFT, SKIN, FULL-THICKNESS;  Surgeon: Miguelina Chicas MD;  Location: [facility] OR;  Service: Orthopedics;  Laterality: Left;    TENOLYSIS, FLEXOR, PALM OR FINGER Left 4/10/2025    Procedure: TENOLYSIS FDP;  Surgeon: Miguelina Chicas MD;  Location: [facility] OR;  Service: Orthopedics;  Laterality: Left;     Family History   Problem Relation Name Age of Onset    Heart disease Father      Hyperlipidemia Father      Diabetes Father       Social History[1]    Objective:     Physical Exam  Vitals reviewed.   Constitutional:       Appearance: Normal appearance. He is not ill-appearing.   HENT:      Head: Normocephalic.      Nose: Nose normal.   Eyes:      General: No scleral icterus.  Pulmonary:      Effort: Pulmonary effort is normal. No respiratory distress.   Musculoskeletal:      Comments: Left hand is post-operative dressing - not removed   Skin:     Findings: No rash.   Neurological:      General: No focal deficit present.      Mental Status: He is alert and oriented to person, place, and time.   Psychiatric:         Mood and Affect: Mood normal.         Behavior: Behavior normal.         Data:    All data, including recent labs, radiology, and pathology, has been independently reviewed.    Labs:    No results for input(s): "WBC", "HGB", "HCT", "NA", "K", "CL", "BUN", "CREATININE", "CRCL", "AST", "ALT", "ALKPHOS", "BILITOT", "CRP", "INR", "PHA51JBUN", "JUR7OVRREWW", "PNW9HRCQDSXQ" in the last 2160 hours.    Invalid input(s): "GRAN%", "PLATELET"     Radiology:    No results found in the last 24 hours.     Assessment:  Nathan Huff is a 35 year old man here to discuss MRSA infection to his left third finger found in surgical cultures. Reports that after his injury there was bone exposed, was given bactrim at " his ED visit on 4/7 and underwent ORIF on 4/10 while still on bactrim. Skin graft completed 4/24 and cultures obtained which grew MRSA. Given prior exposed bone and initial surgical cultures from 4/10 being obtained while on bactrim will just presume osteomyelitis out of caution. Has completed 12 days of bactrim so far, dose is low though - 1 ds BID. Will increase to 2 DS tabs BID and extend to complete 6 week course, end 6/5.    Plan  - baseline cbc, cmp, crp and q2 weeks   - 2 DS tabs bactrim BID until 6/5   - counseled on side effects     Follow up in 4 weeks    I spent a total of 31 minutes on the day of the visit.  This includes face to face time and non-face to face time preparing to see the patient (eg, review of tests), obtaining and/or reviewing separately obtained history, documenting clinical information in the electronic or other health record, independently interpreting results and communicating results to the patient/family/caregiver, or care coordinator.    Visit today included increased complexity associated with the care of the episodic problem osteomyelitis addressed and managing the longitudinal care of the patient due to the serious and/or complex managed problem(s).    Luna Woods MD  Infectious Disease         [1]   Social History  Tobacco Use    Smoking status: Former     Current packs/day: 1.50     Average packs/day: 1.5 packs/day for 2.0 years (3.0 ttl pk-yrs)     Types: Cigarettes     Passive exposure: Past    Smokeless tobacco: Former     Types: Snuff, Chew   Substance Use Topics    Alcohol use: Not Currently     Comment: SOCIAL    Drug use: Never

## 2025-05-06 ENCOUNTER — TELEPHONE (OUTPATIENT)
Dept: INFECTIOUS DISEASES | Facility: CLINIC | Age: 35
End: 2025-05-06
Payer: COMMERCIAL

## 2025-05-06 NOTE — TELEPHONE ENCOUNTER
----- Message from Vicky sent at 5/6/2025  4:09 PM CDT -----  Regarding: Patient Advice  Contact: [phone]  Tiffani/spouse calling to speak nurse regarding side effects of medication doxycycline (VIBRAMYCIN) 100 MG Cap . Pls [phone]

## 2025-05-07 ENCOUNTER — PATIENT MESSAGE (OUTPATIENT)
Dept: INFECTIOUS DISEASES | Facility: CLINIC | Age: 35
End: 2025-05-07
Payer: OTHER MISCELLANEOUS

## 2025-05-07 ENCOUNTER — OFFICE VISIT (OUTPATIENT)
Dept: ORTHOPEDICS | Facility: CLINIC | Age: 35
End: 2025-05-07
Payer: OTHER MISCELLANEOUS

## 2025-05-07 ENCOUNTER — CLINICAL SUPPORT (OUTPATIENT)
Dept: REHABILITATION | Facility: HOSPITAL | Age: 35
End: 2025-05-07
Payer: OTHER MISCELLANEOUS

## 2025-05-07 DIAGNOSIS — S62.639B OPEN FRACTURE OF DISTAL PHALANX OF DIGIT OF LEFT HAND: ICD-10-CM

## 2025-05-07 DIAGNOSIS — S68.113A TRAUMATIC AMPUTATION OF LEFT MIDDLE FINGER: Primary | ICD-10-CM

## 2025-05-07 DIAGNOSIS — M79.645 FINGER PAIN, LEFT: Primary | ICD-10-CM

## 2025-05-07 DIAGNOSIS — S68.113A TRAUMATIC AMPUTATION OF LEFT MIDDLE FINGER: ICD-10-CM

## 2025-05-07 DIAGNOSIS — M25.642 STIFFNESS OF LEFT HAND JOINT: ICD-10-CM

## 2025-05-07 PROCEDURE — 99999 PR PBB SHADOW E&M-EST. PATIENT-LVL III: CPT | Mod: PBBFAC,,, | Performed by: ORTHOPAEDIC SURGERY

## 2025-05-07 PROCEDURE — 97760 ORTHOTIC MGMT&TRAING 1ST ENC: CPT

## 2025-05-07 PROCEDURE — L3913 HFO W/O JOINTS CF: HCPCS

## 2025-05-07 RX ORDER — LINEZOLID 600 MG/1
600 TABLET, FILM COATED ORAL 2 TIMES DAILY
Qty: 58 TABLET | Refills: 0 | Status: SHIPPED | OUTPATIENT
Start: 2025-05-07 | End: 2025-06-05

## 2025-05-07 NOTE — PATIENT INSTRUCTIONS
"[facility], OCCUPATIONAL THERAPY  HOME EXERCISE PROGRAM     Precautions:  - No heavy lifting, gripping, and pinching  - No weightbearing (no pushing or pulling)     Splint wear instructions:  - Full time wear except for hygiene      Pt can come out of orthosis to perform gentle exercise 2-3 x per day.  Complete the following exercises for 10 repetitions, 2-3x/day:       AROM: Wrist Flexion / Extension               Bend your wrist forward and back as far as possible.          AROM: Wrist Radial / Ulnar Deviation  Bend your wrist from side to side as far as possible.              AROM: Isolated PIP Flexion  Bend only middle joint of your finger,   keeping other fingers straight with other hand.      AROM: Isolated MCP Flexion / Extension ("Wave")   Bend only your large, bottom knuckles. Hold 3 seconds.   Keep the tips of your fingers straight. Straighten fingers.      AROM: Isolated IPJ Flexion / Extension ("Hook")   Bend only your middle and end knuckles. Hold 3 seconds.   Straighten your fingers.       AROM: MCP and PIP Flexion / Extension ("Straight Fist")  Bend your bottom and middle knuckles, keeping the tips of your fingers straight.   Try to touch the pads of your fingers on your palm. Hold 3 seconds.   Straighten your fingers.       AROM: Composite Flexion / Extension ("Full Fist")  Bend every joint in your hand into a fist. Hold 3 seconds.   Straighten your fingers.         AROM: Composte Extension ("Finger Lifts")  Lift your finger off of the table one at a time. Hold 3 seconds.   Relax your finger.      AROM: Abduction / Adduction  With hand flat on table, spread all fingers apart,   then bring them together as close as possible.    Therapist: MAULIK Talbert       "

## 2025-05-07 NOTE — PROGRESS NOTES
Nathan Huff presents for post-operative evaluation of   Encounter Diagnoses   Name Primary?    Traumatic amputation of left middle finger Yes    Open fracture of distal phalanx of digit of left hand     History of Present Illness           The patient is now 13 days s/p   1.  Left long finger skin grafting with left hand hypothenar full-thickness harvest, cpt 42358  2.  Left long finger irrigation and excisional debridement skin, subcuatenous tissue, cpt 85355  3.  Skin graft substitute application left long finger, cpt 50701    Patient is 3wk 6 days s/p   1.  Left long finger open reduction internal fixation intra-articular distal phalanx and middle phalanx fractures, cpt 97874  2.  Left long finger irrigation and excisional debridement skin, subcuatenous tissue and bone, cpt 68029  3.  Skin graft substitute application left long finger, cpt 72157    Patient reports that he began dosing doxycycline Monday night (2 doses) and prior to dosing his doxy he felt sore throat/acid reflux. Malaise and clammy in the evening. He has not had to dose any pain medication at this time postop. Pain 2/10. Plaster splint was removed for examination.       Vitals:    05/07/25 0859   PainSc:   2   PainLoc: Hand        PE:    AA&O x 4.  NAD  HEENT:  NCAT, sclera nonicteric  Lungs:  Respirations are equal and unlabored.  CV:  2+ bilateral upper and lower extremity pulses.  MSK: Physical Exam           There is some purulence at the wound.  All ulnar hand sutures were removed today, no erythema or signs of infection. Neurovascularly intact and has 5/5 thenar and intrinsic strength.                  Diagnostic studies and other clinical records review:  In office C-arm xray of left long finger taken today are independently reviewed by me and shows no change in distal phalanx alignment or hardware.                   Assessment & Plan:           Status post above, doing well  OT: radial gutter custom orthosis was fitted today, ID  consulted for abx, will change to levaquin  F/U 1 week for wound check  Call with any questions/concerns in the interim        Miguelina Chicas MD    Please be aware that this note has been generated with the assistance of [employer] voice-to-text.  Please excuse any spelling or grammatical errors.  This note was generated with the assistance of ambient listening technology. Verbal consent was obtained by the patient and accompanying visitor(s) for the recording of patient appointment to facilitate this note. I attest to having reviewed and edited the generated note for accuracy, though some syntax or spelling errors may persist. Please contact the author of this note for any clarification.

## 2025-05-07 NOTE — PROGRESS NOTES
OT Orthosis Only    TIME RECORD    Date:  5/7/2025    Start Time:  10:30  Stop Time:  11:30        Occupational Therapy Orthotic Note    Patient: Nathan Huff  MRN: [id number]  Date of visit: 5/7/2025  Referring Physician:  Miguelina Chicas MD   Next MD visit: 5/14/2025  Primary Diagnosis: S68.113A (ICD-10-CM) - Traumatic amputation of left middle finger   Treatment Diagnosis:   Encounter Diagnoses   Name Primary?    Traumatic amputation of left middle finger     Finger pain, left Yes    Stiffness of left hand joint      DOS: I&D: 4/10/2025  Skin graft: 4/24/2025      Subjective:   Pt presents on this day following post op dressing removal following skin graft and DIP pinning on 4/24/2025. Injury occurred from mittor saw; graft site of ulnar palm. MD requesting radial gutter orthosis for protection of middle digit.     Objective:     Patient seen by OT this session for fabrication of orthosis per MD orders. Fabricated and applied Radial hand based orthosis to protect surgical site.   Assessment:     Orthosis with good fit following fabrication. Pt. Able to tiffani/doff independently.      Patient Education/Response:     Orthotic Fit and Training, 60 minutes:    Pt. Instructed to wear continuous, remove for bathing or hygiene. Patient/caregiver were provided written instructions on orthosis purpose, wear schedule, care and precautions to monitor for increased pain/edema, pressure points, skin breakdown or redness/skin irritation Patient/caregiver to contact clinic for adjustments as needed.  Patient signed copy of orthosis instructions, acknowledging delivery and understanding of wear, care, and precautions    Initiated HEP with gentle AROM.     Plans and Goals:     Goal of Orthosis to protect sx site/protect injury site L ring finger and ulnar aspect of hand. Pt. To DC with  Orthosis. Orthosis Check PRN, f/u with MD at [facility].

## 2025-05-12 ENCOUNTER — LAB VISIT (OUTPATIENT)
Dept: LAB | Facility: HOSPITAL | Age: 35
End: 2025-05-12
Attending: STUDENT IN AN ORGANIZED HEALTH CARE EDUCATION/TRAINING PROGRAM
Payer: OTHER MISCELLANEOUS

## 2025-05-12 DIAGNOSIS — S68.113A TRAUMATIC AMPUTATION OF LEFT MIDDLE FINGER: ICD-10-CM

## 2025-05-12 LAB
ABSOLUTE EOSINOPHIL (OHS): 0.24 K/UL
ABSOLUTE MONOCYTE (OHS): 0.44 K/UL (ref 0.3–1)
ABSOLUTE NEUTROPHIL COUNT (OHS): 3.34 K/UL (ref 1.8–7.7)
ALBUMIN SERPL BCP-MCNC: 4.5 G/DL (ref 3.5–5.2)
ALP SERPL-CCNC: 81 UNIT/L (ref 40–150)
ALT SERPL W/O P-5'-P-CCNC: 34 UNIT/L (ref 10–44)
ANION GAP (OHS): 7 MMOL/L (ref 8–16)
AST SERPL-CCNC: 17 UNIT/L (ref 11–45)
BASOPHILS # BLD AUTO: 0.03 K/UL
BASOPHILS NFR BLD AUTO: 0.5 %
BILIRUB SERPL-MCNC: 1.4 MG/DL (ref 0.1–1)
BUN SERPL-MCNC: 10 MG/DL (ref 6–20)
CALCIUM SERPL-MCNC: 9.9 MG/DL (ref 8.7–10.5)
CHLORIDE SERPL-SCNC: 104 MMOL/L (ref 95–110)
CO2 SERPL-SCNC: 29 MMOL/L (ref 23–29)
CREAT SERPL-MCNC: 0.8 MG/DL (ref 0.5–1.4)
ERYTHROCYTE [DISTWIDTH] IN BLOOD BY AUTOMATED COUNT: 12.1 % (ref 11.5–14.5)
GFR SERPLBLD CREATININE-BSD FMLA CKD-EPI: >60 ML/MIN/1.73/M2
GLUCOSE SERPL-MCNC: 81 MG/DL (ref 70–110)
HCT VFR BLD AUTO: 45.5 % (ref 40–54)
HGB BLD-MCNC: 15.8 GM/DL (ref 14–18)
IMM GRANULOCYTES # BLD AUTO: 0.03 K/UL (ref 0–0.04)
IMM GRANULOCYTES NFR BLD AUTO: 0.5 % (ref 0–0.5)
LYMPHOCYTES # BLD AUTO: 2.5 K/UL (ref 1–4.8)
MCH RBC QN AUTO: 30.7 PG (ref 27–31)
MCHC RBC AUTO-ENTMCNC: 34.7 G/DL (ref 32–36)
MCV RBC AUTO: 89 FL (ref 82–98)
NUCLEATED RBC (/100WBC) (OHS): 0 /100 WBC
PLATELET # BLD AUTO: 244 K/UL (ref 150–450)
PMV BLD AUTO: 9.7 FL (ref 9.2–12.9)
POTASSIUM SERPL-SCNC: 4.6 MMOL/L (ref 3.5–5.1)
PROT SERPL-MCNC: 8.1 GM/DL (ref 6–8.4)
RBC # BLD AUTO: 5.14 M/UL (ref 4.6–6.2)
RELATIVE EOSINOPHIL (OHS): 3.6 %
RELATIVE LYMPHOCYTE (OHS): 38 % (ref 18–48)
RELATIVE MONOCYTE (OHS): 6.7 % (ref 4–15)
RELATIVE NEUTROPHIL (OHS): 50.7 % (ref 38–73)
SODIUM SERPL-SCNC: 140 MMOL/L (ref 136–145)
WBC # BLD AUTO: 6.58 K/UL (ref 3.9–12.7)

## 2025-05-12 PROCEDURE — 86140 C-REACTIVE PROTEIN: CPT

## 2025-05-12 PROCEDURE — 84075 ASSAY ALKALINE PHOSPHATASE: CPT

## 2025-05-12 PROCEDURE — 36415 COLL VENOUS BLD VENIPUNCTURE: CPT

## 2025-05-12 PROCEDURE — 85025 COMPLETE CBC W/AUTO DIFF WBC: CPT

## 2025-05-13 LAB — CRP SERPL-MCNC: 1.6 MG/L

## 2025-05-14 ENCOUNTER — PATIENT MESSAGE (OUTPATIENT)
Dept: ORTHOPEDICS | Facility: CLINIC | Age: 35
End: 2025-05-14
Payer: COMMERCIAL

## 2025-05-14 ENCOUNTER — OFFICE VISIT (OUTPATIENT)
Dept: ORTHOPEDICS | Facility: CLINIC | Age: 35
End: 2025-05-14
Payer: OTHER MISCELLANEOUS

## 2025-05-14 DIAGNOSIS — S68.113A TRAUMATIC AMPUTATION OF LEFT MIDDLE FINGER: ICD-10-CM

## 2025-05-14 DIAGNOSIS — S62.639B OPEN FRACTURE OF DISTAL PHALANX OF DIGIT OF LEFT HAND: ICD-10-CM

## 2025-05-14 DIAGNOSIS — Z98.890 POST-OPERATIVE STATE: Primary | ICD-10-CM

## 2025-05-14 NOTE — PROGRESS NOTES
Nathan Huff presents for post-operative evaluation of   Encounter Diagnoses   Name Primary?    Post-operative state Yes    Open fracture of distal phalanx of digit of left hand     Traumatic amputation of left middle finger    . The patient is now 2 weeks 6 days s/p   1.  Left long finger skin grafting with left hand hypothenar full-thickness harvest, cpt 64790  2.  Left long finger irrigation and excisional debridement skin, subcuatenous tissue, cpt 04429  3.  Skin graft substitute application left long finger, cpt 71976     Patient is also 4 weeks 6 days s/p:  1.  Left long finger open reduction internal fixation intra-articular distal phalanx and middle phalanx fractures, cpt 71525  2.  Left long finger irrigation and excisional debridement skin, subcuatenous tissue and bone, cpt 96882  3.  Skin graft substitute application left long finger, cpt 05299    HPI:  Patient presents for follow-up of a finger injury that required skin grafting. The wound is healing and looking better than last week. He reports occasional sharp sensations in the finger and some limitation in range of motion due to pressure, but can bend the finger adequately. He has not taken ibuprofen for about a week but is taking antibiotics as prescribed. He has been performing dressing changes at home.    Vitals:    05/14/25 1315   PainSc: 0-No pain   PainLoc: Finger       PE:    AA&O x 4.  NAD  HEENT:  NCAT, sclera nonicteric  Lungs:  Respirations are equal and unlabored.  CV:  2+ bilateral upper and lower extremity pulses.  MSK:     Left long finger incisional area and graft is clean, dry and intact. No warmth, erythema, drainage, or signs of further infection. Chromic sutures removed without difficulty. NVI. Appropriate po swelling. 5/5 thenar and intrinsic strength.                     Diagnostic studies and other clinical records review:  Last xray 4/7/25     Assessment & Plan:   MEDICATIONS:  - Continue taking antibiotics as  directed.    PATIENT INSTRUCTIONS:  - Perform finger exercises 3-4 times daily to maintain joint flexibility.  - Continue protecting the finger and performing dressing changes.  - Unwrap the wound and allow it to dry for a period during midday.        Status post above, doing well  OT: hold at this time, splint adjustments prn  F/U 3 weeks with new xrays  Call with any questions/concerns in the interim        Miguelina Chicas MD    Please be aware that this note has been generated with the assistance of [employer] voice-to-text.  Please excuse any spelling or grammatical errors.  This note was generated with the assistance of ambient listening technology. Verbal consent was obtained by the patient and accompanying visitor(s) for the recording of patient appointment to facilitate this note. I attest to having reviewed and edited the generated note for accuracy, though some syntax or spelling errors may persist. Please contact the author of this note for any clarification.

## 2025-05-19 ENCOUNTER — OFFICE VISIT (OUTPATIENT)
Dept: FAMILY MEDICINE | Facility: CLINIC | Age: 35
End: 2025-05-19
Payer: COMMERCIAL

## 2025-05-19 VITALS
RESPIRATION RATE: 18 BRPM | SYSTOLIC BLOOD PRESSURE: 120 MMHG | WEIGHT: 233.69 LBS | HEART RATE: 72 BPM | HEIGHT: 72 IN | BODY MASS INDEX: 31.65 KG/M2 | OXYGEN SATURATION: 97 % | DIASTOLIC BLOOD PRESSURE: 80 MMHG

## 2025-05-19 DIAGNOSIS — Z00.00 PREVENTATIVE HEALTH CARE: Primary | ICD-10-CM

## 2025-05-19 DIAGNOSIS — S68.113A TRAUMATIC AMPUTATION OF LEFT MIDDLE FINGER: ICD-10-CM

## 2025-05-19 DIAGNOSIS — E78.5 HYPERLIPIDEMIA, UNSPECIFIED HYPERLIPIDEMIA TYPE: ICD-10-CM

## 2025-05-19 PROCEDURE — 99999 PR PBB SHADOW E&M-EST. PATIENT-LVL III: CPT | Mod: PBBFAC,,, | Performed by: FAMILY MEDICINE

## 2025-05-19 PROCEDURE — 3074F SYST BP LT 130 MM HG: CPT | Mod: CPTII,S$GLB,, | Performed by: FAMILY MEDICINE

## 2025-05-19 PROCEDURE — 1160F RVW MEDS BY RX/DR IN RCRD: CPT | Mod: CPTII,S$GLB,, | Performed by: FAMILY MEDICINE

## 2025-05-19 PROCEDURE — 99395 PREV VISIT EST AGE 18-39: CPT | Mod: S$GLB,,, | Performed by: FAMILY MEDICINE

## 2025-05-19 PROCEDURE — 1159F MED LIST DOCD IN RCRD: CPT | Mod: CPTII,S$GLB,, | Performed by: FAMILY MEDICINE

## 2025-05-19 PROCEDURE — 3079F DIAST BP 80-89 MM HG: CPT | Mod: CPTII,S$GLB,, | Performed by: FAMILY MEDICINE

## 2025-05-19 PROCEDURE — 3008F BODY MASS INDEX DOCD: CPT | Mod: CPTII,S$GLB,, | Performed by: FAMILY MEDICINE

## 2025-05-19 NOTE — PROGRESS NOTES
Subjective:       Patient ID: Nathan Huff is a 35 y.o. male.    Chief Complaint: Annual Exam    Pt is a 35 y.o. male who presents for evaluation and management of   Encounter Diagnoses   Name Primary?    Preventative health care Yes    Traumatic amputation of left middle finger     Hyperlipidemia, unspecified hyperlipidemia type    .  Doing well on current meds. Denies any side effects. Prevention is up to date.  Review of Systems   Constitutional:  Negative for fever.   Respiratory:  Negative for shortness of breath.    Cardiovascular:  Negative for chest pain.   Gastrointestinal:  Negative for anal bleeding and blood in stool.   Genitourinary:  Negative for dysuria.   Neurological:  Positive for headaches. Negative for dizziness and light-headedness.       Objective:      Physical Exam  Constitutional:       Appearance: Normal appearance. He is well-developed. He is not ill-appearing.   HENT:      Head: Normocephalic and atraumatic.      Right Ear: External ear normal.      Left Ear: External ear normal.      Nose: Nose normal.      Mouth/Throat:      Mouth: Mucous membranes are moist.      Pharynx: No oropharyngeal exudate or posterior oropharyngeal erythema.   Eyes:      General: No scleral icterus.        Right eye: No discharge.         Left eye: No discharge.      Conjunctiva/sclera: Conjunctivae normal.      Pupils: Pupils are equal, round, and reactive to light.   Neck:      Thyroid: No thyromegaly.      Vascular: No JVD.      Trachea: No tracheal deviation.   Cardiovascular:      Rate and Rhythm: Normal rate and regular rhythm.      Heart sounds: Normal heart sounds. No murmur heard.  Pulmonary:      Effort: Pulmonary effort is normal. No respiratory distress.      Breath sounds: Normal breath sounds. No wheezing or rales.   Chest:      Chest wall: No tenderness.   Abdominal:      General: Bowel sounds are normal. There is no distension.      Palpations: Abdomen is soft. There is no mass.      Tenderness:  There is no abdominal tenderness. There is no guarding or rebound.   Musculoskeletal:         General: Normal range of motion.      Cervical back: Normal range of motion and neck supple.      Right lower leg: No edema.      Left lower leg: No edema.   Lymphadenopathy:      Cervical: No cervical adenopathy.   Skin:     General: Skin is warm and dry.   Neurological:      Mental Status: He is alert and oriented to person, place, and time.      Cranial Nerves: No cranial nerve deficit.      Motor: No abnormal muscle tone.      Coordination: Coordination normal.      Deep Tendon Reflexes: Reflexes are normal and symmetric. Reflexes normal.   Psychiatric:         Behavior: Behavior normal.         Thought Content: Thought content normal.         Judgment: Judgment normal.         Assessment:       1. Preventative health care    2. Traumatic amputation of left middle finger    3. Hyperlipidemia, unspecified hyperlipidemia type        Plan:   1. Preventative health care  -     Comprehensive Metabolic Panel; Future; Expected date: 05/19/2025  -     Hemoglobin A1C; Future; Expected date: 05/19/2025  -     Lipid Panel; Future; Expected date: 05/19/2025    2. Traumatic amputation of left middle finger  Overview:  Seeing hand specialist. Is on 6 weeks of Zyvox to be completed June 4       3. Hyperlipidemia, unspecified hyperlipidemia type  -     Comprehensive Metabolic Panel; Future; Expected date: 05/19/2025  -     Hemoglobin A1C; Future; Expected date: 05/19/2025  -     Lipid Panel; Future; Expected date: 05/19/2025      No follow-ups on file.

## 2025-05-22 ENCOUNTER — CLINICAL SUPPORT (OUTPATIENT)
Dept: FAMILY MEDICINE | Facility: CLINIC | Age: 35
End: 2025-05-22
Payer: COMMERCIAL

## 2025-05-22 ENCOUNTER — RESULTS FOLLOW-UP (OUTPATIENT)
Dept: INFECTIOUS DISEASES | Facility: HOSPITAL | Age: 35
End: 2025-05-22

## 2025-05-22 DIAGNOSIS — S68.113A TRAUMATIC AMPUTATION OF LEFT MIDDLE FINGER: ICD-10-CM

## 2025-05-22 DIAGNOSIS — E78.5 HYPERLIPIDEMIA, UNSPECIFIED HYPERLIPIDEMIA TYPE: ICD-10-CM

## 2025-05-22 DIAGNOSIS — Z00.00 PREVENTATIVE HEALTH CARE: ICD-10-CM

## 2025-05-22 LAB
ABSOLUTE EOSINOPHIL (OHS): 0.24 K/UL
ABSOLUTE MONOCYTE (OHS): 0.47 K/UL (ref 0.3–1)
ABSOLUTE NEUTROPHIL COUNT (OHS): 3.03 K/UL (ref 1.8–7.7)
ALBUMIN SERPL BCP-MCNC: 4.1 G/DL (ref 3.5–5.2)
ALP SERPL-CCNC: 77 UNIT/L (ref 40–150)
ALT SERPL W/O P-5'-P-CCNC: 33 UNIT/L (ref 10–44)
ANION GAP (OHS): 10 MMOL/L (ref 8–16)
AST SERPL-CCNC: 19 UNIT/L (ref 11–45)
BASOPHILS # BLD AUTO: 0.03 K/UL
BASOPHILS NFR BLD AUTO: 0.5 %
BILIRUB SERPL-MCNC: 1 MG/DL (ref 0.1–1)
BUN SERPL-MCNC: 10 MG/DL (ref 6–20)
CALCIUM SERPL-MCNC: 8.9 MG/DL (ref 8.7–10.5)
CHLORIDE SERPL-SCNC: 105 MMOL/L (ref 95–110)
CHOLEST SERPL-MCNC: 217 MG/DL (ref 120–199)
CHOLEST/HDLC SERPL: 5.7 {RATIO} (ref 2–5)
CO2 SERPL-SCNC: 24 MMOL/L (ref 23–29)
CREAT SERPL-MCNC: 0.8 MG/DL (ref 0.5–1.4)
CRP SERPL-MCNC: 1.5 MG/L
EAG (OHS): 103 MG/DL (ref 68–131)
ERYTHROCYTE [DISTWIDTH] IN BLOOD BY AUTOMATED COUNT: 12.4 % (ref 11.5–14.5)
GFR SERPLBLD CREATININE-BSD FMLA CKD-EPI: >60 ML/MIN/1.73/M2
GLUCOSE SERPL-MCNC: 97 MG/DL (ref 70–110)
HBA1C MFR BLD: 5.2 % (ref 4–5.6)
HCT VFR BLD AUTO: 44.7 % (ref 40–54)
HDLC SERPL-MCNC: 38 MG/DL (ref 40–75)
HDLC SERPL: 17.5 % (ref 20–50)
HGB BLD-MCNC: 15.2 GM/DL (ref 14–18)
IMM GRANULOCYTES # BLD AUTO: 0.02 K/UL (ref 0–0.04)
IMM GRANULOCYTES NFR BLD AUTO: 0.3 % (ref 0–0.5)
LDLC SERPL CALC-MCNC: 141.6 MG/DL (ref 63–159)
LYMPHOCYTES # BLD AUTO: 2.27 K/UL (ref 1–4.8)
MCH RBC QN AUTO: 30.5 PG (ref 27–31)
MCHC RBC AUTO-ENTMCNC: 34 G/DL (ref 32–36)
MCV RBC AUTO: 90 FL (ref 82–98)
NONHDLC SERPL-MCNC: 179 MG/DL
NUCLEATED RBC (/100WBC) (OHS): 0 /100 WBC
PLATELET # BLD AUTO: 167 K/UL (ref 150–450)
PMV BLD AUTO: 10.4 FL (ref 9.2–12.9)
POTASSIUM SERPL-SCNC: 4.3 MMOL/L (ref 3.5–5.1)
PROT SERPL-MCNC: 7.8 GM/DL (ref 6–8.4)
RBC # BLD AUTO: 4.98 M/UL (ref 4.6–6.2)
RELATIVE EOSINOPHIL (OHS): 4 %
RELATIVE LYMPHOCYTE (OHS): 37.5 % (ref 18–48)
RELATIVE MONOCYTE (OHS): 7.8 % (ref 4–15)
RELATIVE NEUTROPHIL (OHS): 49.9 % (ref 38–73)
SODIUM SERPL-SCNC: 139 MMOL/L (ref 136–145)
TRIGL SERPL-MCNC: 187 MG/DL (ref 30–150)
WBC # BLD AUTO: 6.06 K/UL (ref 3.9–12.7)

## 2025-05-22 PROCEDURE — 80061 LIPID PANEL: CPT

## 2025-05-22 PROCEDURE — 83036 HEMOGLOBIN GLYCOSYLATED A1C: CPT

## 2025-05-22 PROCEDURE — 86140 C-REACTIVE PROTEIN: CPT

## 2025-05-22 PROCEDURE — 36415 COLL VENOUS BLD VENIPUNCTURE: CPT | Mod: S$GLB,,,

## 2025-05-22 PROCEDURE — 82040 ASSAY OF SERUM ALBUMIN: CPT

## 2025-05-22 PROCEDURE — 85025 COMPLETE CBC W/AUTO DIFF WBC: CPT

## 2025-05-25 ENCOUNTER — RESULTS FOLLOW-UP (OUTPATIENT)
Dept: HEPATOLOGY | Facility: HOSPITAL | Age: 35
End: 2025-05-25

## 2025-05-25 NOTE — PROGRESS NOTES
Labs look good. His cholesterol his a little high but because of his young age he does not yet need to be on cholesterol medication. Try and eat more lean proteins like chicken breast and fish. Reduce the amount of red meat and heavy dairy in his diet. Thanks

## 2025-05-28 ENCOUNTER — TELEPHONE (OUTPATIENT)
Dept: ORTHOPEDICS | Facility: CLINIC | Age: 35
End: 2025-05-28
Payer: COMMERCIAL

## 2025-05-28 DIAGNOSIS — S62.639B OPEN FRACTURE OF DISTAL PHALANX OF DIGIT OF LEFT HAND: Primary | ICD-10-CM

## 2025-05-28 NOTE — TELEPHONE ENCOUNTER
Patient communication:    Notified patient to stop at [facility] Location - 1st Floor [address] [facility], Please park in [address] and use [address] elevators 30 minutes prior to your appointment time for X-ray. After your X-ray please proceed to 9th Floor [address] for Appointment on 6/4/25 with Dr. Chicas for x-rays.     Made them aware that this is not a scheduled xray appointment and they might be running behind as they are considered a walk-in xray.    Verbalized the Following:  *Please arrive at your informed time above, if you are more than 15 Minutes late to your appointment with Dr. Chicas we will have to reschedule your appointment. This will allow you to be seen in a timely manor and be conscious to other patients being seen that same day*

## 2025-06-04 ENCOUNTER — HOSPITAL ENCOUNTER (OUTPATIENT)
Dept: RADIOLOGY | Facility: OTHER | Age: 35
Discharge: HOME | End: 2025-06-04
Attending: ORTHOPAEDIC SURGERY
Payer: OTHER MISCELLANEOUS

## 2025-06-04 ENCOUNTER — OFFICE VISIT (OUTPATIENT)
Dept: ORTHOPEDICS | Facility: CLINIC | Age: 35
End: 2025-06-04
Payer: OTHER MISCELLANEOUS

## 2025-06-04 ENCOUNTER — OFFICE VISIT (OUTPATIENT)
Dept: INFECTIOUS DISEASES | Facility: CLINIC | Age: 35
End: 2025-06-04
Payer: OTHER MISCELLANEOUS

## 2025-06-04 VITALS
SYSTOLIC BLOOD PRESSURE: 119 MMHG | HEART RATE: 62 BPM | TEMPERATURE: 98 F | BODY MASS INDEX: 31.99 KG/M2 | WEIGHT: 235.88 LBS | DIASTOLIC BLOOD PRESSURE: 81 MMHG

## 2025-06-04 DIAGNOSIS — S68.113A TRAUMATIC AMPUTATION OF LEFT MIDDLE FINGER: ICD-10-CM

## 2025-06-04 DIAGNOSIS — S68.113A TRAUMATIC AMPUTATION OF LEFT MIDDLE FINGER: Primary | ICD-10-CM

## 2025-06-04 DIAGNOSIS — S62.639B OPEN FRACTURE OF DISTAL PHALANX OF DIGIT OF LEFT HAND: ICD-10-CM

## 2025-06-04 DIAGNOSIS — Z98.890 POST-OPERATIVE STATE: Primary | ICD-10-CM

## 2025-06-04 DIAGNOSIS — S61.209A FINGER, OPEN WOUNDS WITH TENDON INJURY, INITIAL ENCOUNTER: ICD-10-CM

## 2025-06-04 PROCEDURE — 99024 POSTOP FOLLOW-UP VISIT: CPT | Mod: S$GLB,,, | Performed by: ORTHOPAEDIC SURGERY

## 2025-06-04 PROCEDURE — 73140 X-RAY EXAM OF FINGER(S): CPT | Mod: TC,FY,LT

## 2025-06-04 PROCEDURE — 99999 PR PBB SHADOW E&M-EST. PATIENT-LVL III: CPT | Mod: PBBFAC,,, | Performed by: ORTHOPAEDIC SURGERY

## 2025-06-04 RX ORDER — LINEZOLID 600 MG/1
600 TABLET, FILM COATED ORAL 2 TIMES DAILY
Qty: 14 TABLET | Refills: 0 | Status: SHIPPED | OUTPATIENT
Start: 2025-06-04 | End: 2025-06-11

## 2025-06-04 NOTE — PROGRESS NOTES
Nahtan Huff presents for post-operative evaluation of   Encounter Diagnoses   Name Primary?    Post-operative state Yes    Open fracture of distal phalanx of digit of left hand     Traumatic amputation of left middle finger     Finger, open wounds with tendon injury, initial encounter    .     #2) The patient is now 5 weeks 6 days s/p 4/25/25 Left long finger I&D, Hypothenar FTSG & Kerecis application.  --Intra-op cx: Methicillin resistant Staphylococcus aureus Oral abx 5/7/25 Linezolid 600mg BID ID: 6/4/25    #1) Patient is 7 weeks 6 days s/p 4/10/25 Left long finger Open reduction and pinning, I&D, Kerecis skin graft substitute    Overall the patient reports doing well.  The patient reports appropriate postoperative soreness with well controlled overall pain.            Vitals:    06/04/25 1120   PainSc: 0-No pain   PainLoc: Hand       PE:    AA&O x 4.  NAD  HEENT:  NCAT, sclera nonicteric  Lungs:  Respirations are equal and unlabored.  CV:  2+ bilateral upper and lower extremity pulses.  MSK: Physical Exam          Left long finger incisional area and graft is clean, dry and intact. No warmth, erythema, drainage, or signs of further infection. NVI. Appropriate po swelling. 5/5 thenar and intrinsic strength.      Diagnostic studies and other clinical records review:  X-rays AP, lateral and oblique left small finger  hand taken today are independently reviewed by me and shows healing distal phalanx fracture.    Assessment & Plan: Assessment & Plan           Status post above, doing well  OT: hold at this time, splint adjustments prn  F/U 2 weeks for possible pin removal with new xrays left long finger  Call with any questions/concerns in the interim        Miguelina Chicas MD    Please be aware that this note has been generated with the assistance of [employer] voice-to-text.  Please excuse any spelling or grammatical errors.  This note was generated with the assistance of ambient listening technology. Verbal consent  was obtained by the patient and accompanying visitor(s) for the recording of patient appointment to facilitate this note. I attest to having reviewed and edited the generated note for accuracy, though some syntax or spelling errors may persist. Please contact the author of this note for any clarification.

## 2025-06-06 ENCOUNTER — RESULTS FOLLOW-UP (OUTPATIENT)
Dept: INFECTIOUS DISEASES | Facility: HOSPITAL | Age: 35
End: 2025-06-06

## 2025-06-06 ENCOUNTER — LAB VISIT (OUTPATIENT)
Dept: LAB | Facility: HOSPITAL | Age: 35
End: 2025-06-06
Attending: STUDENT IN AN ORGANIZED HEALTH CARE EDUCATION/TRAINING PROGRAM
Payer: OTHER MISCELLANEOUS

## 2025-06-06 DIAGNOSIS — S68.113A TRAUMATIC AMPUTATION OF LEFT MIDDLE FINGER: ICD-10-CM

## 2025-06-06 LAB
ABSOLUTE EOSINOPHIL (OHS): 0.25 K/UL
ABSOLUTE MONOCYTE (OHS): 0.48 K/UL (ref 0.3–1)
ABSOLUTE NEUTROPHIL COUNT (OHS): 2.5 K/UL (ref 1.8–7.7)
BASOPHILS # BLD AUTO: 0.03 K/UL
BASOPHILS NFR BLD AUTO: 0.5 %
ERYTHROCYTE [DISTWIDTH] IN BLOOD BY AUTOMATED COUNT: 13.2 % (ref 11.5–14.5)
HCT VFR BLD AUTO: 41 % (ref 40–54)
HGB BLD-MCNC: 14.5 GM/DL (ref 14–18)
IMM GRANULOCYTES # BLD AUTO: 0.01 K/UL (ref 0–0.04)
IMM GRANULOCYTES NFR BLD AUTO: 0.2 % (ref 0–0.5)
LYMPHOCYTES # BLD AUTO: 2.58 K/UL (ref 1–4.8)
MCH RBC QN AUTO: 31.3 PG (ref 27–31)
MCHC RBC AUTO-ENTMCNC: 35.4 G/DL (ref 32–36)
MCV RBC AUTO: 89 FL (ref 82–98)
NUCLEATED RBC (/100WBC) (OHS): 0 /100 WBC
PLATELET # BLD AUTO: 163 K/UL (ref 150–450)
PMV BLD AUTO: 10.3 FL (ref 9.2–12.9)
RBC # BLD AUTO: 4.63 M/UL (ref 4.6–6.2)
RELATIVE EOSINOPHIL (OHS): 4.3 %
RELATIVE LYMPHOCYTE (OHS): 44.1 % (ref 18–48)
RELATIVE MONOCYTE (OHS): 8.2 % (ref 4–15)
RELATIVE NEUTROPHIL (OHS): 42.7 % (ref 38–73)
WBC # BLD AUTO: 5.85 K/UL (ref 3.9–12.7)

## 2025-06-06 PROCEDURE — 85025 COMPLETE CBC W/AUTO DIFF WBC: CPT

## 2025-06-06 PROCEDURE — 36415 COLL VENOUS BLD VENIPUNCTURE: CPT

## 2025-06-13 DIAGNOSIS — S68.113A TRAUMATIC AMPUTATION OF LEFT MIDDLE FINGER: ICD-10-CM

## 2025-06-13 DIAGNOSIS — S62.639B OPEN FRACTURE OF DISTAL PHALANX OF DIGIT OF LEFT HAND: Primary | ICD-10-CM

## 2025-06-20 ENCOUNTER — PATIENT MESSAGE (OUTPATIENT)
Dept: ORTHOPEDICS | Facility: CLINIC | Age: 35
End: 2025-06-20
Payer: COMMERCIAL

## 2025-06-25 ENCOUNTER — HOSPITAL ENCOUNTER (OUTPATIENT)
Dept: RADIOLOGY | Facility: OTHER | Age: 35
Discharge: HOME | End: 2025-06-25
Attending: ORTHOPAEDIC SURGERY
Payer: COMMERCIAL

## 2025-06-25 ENCOUNTER — OFFICE VISIT (OUTPATIENT)
Dept: ORTHOPEDICS | Facility: CLINIC | Age: 35
End: 2025-06-25
Payer: COMMERCIAL

## 2025-06-25 DIAGNOSIS — Z98.890 POST-OPERATIVE STATE: Primary | ICD-10-CM

## 2025-06-25 DIAGNOSIS — S62.639B OPEN FRACTURE OF DISTAL PHALANX OF DIGIT OF LEFT HAND: ICD-10-CM

## 2025-06-25 DIAGNOSIS — S68.113A TRAUMATIC AMPUTATION OF LEFT MIDDLE FINGER: ICD-10-CM

## 2025-06-25 PROCEDURE — 99024 POSTOP FOLLOW-UP VISIT: CPT | Mod: S$GLB,,, | Performed by: ORTHOPAEDIC SURGERY

## 2025-06-25 PROCEDURE — 73140 X-RAY EXAM OF FINGER(S): CPT | Mod: TC,FY,LT

## 2025-06-25 PROCEDURE — 73140 X-RAY EXAM OF FINGER(S): CPT | Mod: 26,LT,, | Performed by: RADIOLOGY

## 2025-06-25 PROCEDURE — 1160F RVW MEDS BY RX/DR IN RCRD: CPT | Mod: CPTII,S$GLB,, | Performed by: ORTHOPAEDIC SURGERY

## 2025-06-25 PROCEDURE — 99999 PR PBB SHADOW E&M-EST. PATIENT-LVL II: CPT | Mod: PBBFAC,,, | Performed by: ORTHOPAEDIC SURGERY

## 2025-06-25 PROCEDURE — 3044F HG A1C LEVEL LT 7.0%: CPT | Mod: CPTII,S$GLB,, | Performed by: ORTHOPAEDIC SURGERY

## 2025-06-25 PROCEDURE — 1159F MED LIST DOCD IN RCRD: CPT | Mod: CPTII,S$GLB,, | Performed by: ORTHOPAEDIC SURGERY

## 2025-06-25 NOTE — PROGRESS NOTES
Nathan Huff presents for post-operative evaluation of   Encounter Diagnoses   Name Primary?    Post-operative state Yes    Traumatic amputation of left middle finger     Open fracture of distal phalanx of digit of left hand    .   The patient is now:    #2) 8 weeks 6 days s/p Application, Graft, Skin, Full-thickness - Left, Irrigation And Debridement - Left, and Application, Acellular Human Dermal Allograft - Left  --Intra-op cx: Methicillin resistant Staphylococcus aureus Oral abx 5/7/25 Linezolid 600mg BID ID: 6/4/25    #1) 10 weeks 6 days 4/10/25 Left long finger Open reduction and pinning, I&D, Kerecis skin graft substitute      Overall the patient reports doing well.   History of Present Illness    HPI:  Patient presents for follow-up of a finger injury. During a recent trip to [place], the nail on the affected finger started flaking off and getting caught on objects. He trimmed the nail until it was completely removed. Within the last three days, the pin in his finger started to protrude from the end.         Vitals:    06/25/25 1539   PainSc: 0-No pain   PainLoc: Hand       PE:    AA&O x 4.  NAD  HEENT:  NCAT, sclera nonicteric  Lungs:  Respirations are equal and unlabored.  CV:  2+ bilateral upper and lower extremity pulses.  MSK: Physical Exam    Musculoskeletal: Pin protruding from fingertip. Improved PIP range of motion.  The incision is well healed. Neurovascularly intact and has 5/5 thenar and intrinsic musculature strength.       Procedure:   left long finger K-wire removed from finger in its entirety. Covered with bandaid.      Diagnostic studies and other clinical records review:  X-rays AP, lateral and oblique left long finger taken today are independently reviewed by me and shows healing distal phalanx fracture with no change in alignment.    Infectious Disease visit 6/4/25 Dr. Luna Woods:  Plan  - complete 6 weeks of linezolid today   - follow up as needed     Assessment & Plan:  Assessment & Plan    PROCEDURES:  - Removed pin from patient's finger.    FOLLOW UP:  - Follow up in 1 month unless sooner visit is needed.    PATIENT INSTRUCTIONS:  - Use splint to protect finger, especially when walking around and sleeping.  - Apply antibiotic ointment to pin removal site 2 times daily.  - Keep a band-aid on the pin removal site during the day and when active.  - Allow pin removal site to air out when inactive (e.g., watching TV).  - Avoid exposing the pin removal site to dirty water (e.g., dishwater, tub water).  - Maintain these precautions until the scab forms and falls off naturally.  - Avoid activities that could jam or forcefully bend the fingertip.  - Can wash hand in shower or sink despite pin removal.  - Send a picture of the finger to the office tomorrow for documentation.      Status post above, doing well  OT:   Splint wear: sleeping and around others  Splint adjustments as needed.   Work status: light duty, no pushing pulling or lifting > #2lbs  F/U 4 weeks with new xrays left long finger  Call with any questions/concerns in the interim        Miguelina Chicas MD    Please be aware that this note has been generated with the assistance of [employer] voice-to-text.  Please excuse any spelling or grammatical errors.  This note was generated with the assistance of ambient listening technology. Verbal consent was obtained by the patient and accompanying visitor(s) for the recording of patient appointment to facilitate this note. I attest to having reviewed and edited the generated note for accuracy, though some syntax or spelling errors may persist. Please contact the author of this note for any clarification.

## 2025-06-26 ENCOUNTER — TELEPHONE (OUTPATIENT)
Dept: ORTHOPEDICS | Facility: CLINIC | Age: 35
End: 2025-06-26
Payer: COMMERCIAL

## 2025-06-26 ENCOUNTER — PATIENT MESSAGE (OUTPATIENT)
Dept: ORTHOPEDICS | Facility: CLINIC | Age: 35
End: 2025-06-26
Payer: COMMERCIAL

## 2025-06-26 NOTE — TELEPHONE ENCOUNTER
Patient communication:  - made sure patient had Ot's number just in case he needed a splint adjustment.   - notified of works status update.     Hitesh Marsh MS, OTC   Sports Medicine Assistant   [facility]  (P) [phone]  (F) [phone]      Copied from CRM #[id number]. Topic: General Inquiry - Information Request  >> Jun 26, 2025  2:13 PM Albert wrote:  Pt states that he does not need the splint adjusted and is okay with appt on 07/23

## 2025-06-26 NOTE — TELEPHONE ENCOUNTER
Copied from CRM #[id number]. Topic: General Inquiry - Patient Advice  >> Jun 26, 2025 12:13 PM Jonathon wrote:  Kajal Beauchamp is requesting office notes from visit 6/25, please call [phone] ext [phone]   Fax [phone]    Claim #[id number]

## 2025-07-02 ENCOUNTER — PATIENT MESSAGE (OUTPATIENT)
Dept: INFECTIOUS DISEASES | Facility: CLINIC | Age: 35
End: 2025-07-02
Payer: COMMERCIAL

## 2025-07-11 DIAGNOSIS — Z98.890 POST-OPERATIVE STATE: Primary | ICD-10-CM

## 2025-07-11 DIAGNOSIS — S68.113A TRAUMATIC AMPUTATION OF LEFT MIDDLE FINGER: ICD-10-CM

## 2025-07-17 ENCOUNTER — TELEPHONE (OUTPATIENT)
Dept: ORTHOPEDICS | Facility: CLINIC | Age: 35
End: 2025-07-17
Payer: COMMERCIAL

## 2025-07-17 NOTE — TELEPHONE ENCOUNTER
Patient communication:    Notified patient to stop at [facility] Location - 1st Floor [address] , Please park in [address] and use [address] elevators 30 minutes prior to your appointment time for X-ray. After your X-ray please proceed to 9th Floor [address] for Appointment on 7/23/25 with Dr. Chicas for x-rays.     Made them aware that this is not a scheduled xray appointment and they might be running behind as they are considered a walk-in xray.    Verbalized the Following:  *Please arrive at your informed time above, if you are more than 15 Minutes late to your appointment with Dr. Chicas we will have to reschedule your appointment. This will allow you to be seen in a timely manor and be conscious to other patients being seen that same day*             Appointment and X-ray confirmed    Hitesh Marsh MS, OTC   Sports Medicine Assistant   [facility]  (P) [phone]  (F) [phone]

## 2025-07-22 NOTE — PROGRESS NOTES
Nathan Huff presents for post-operative evaluation of   Encounter Diagnoses   Name Primary?    Encounter related to worker's compensation claim Yes    Post-operative state     Traumatic amputation of left middle finger     Open fracture of distal phalanx of digit of left hand    . The patient is now 3 months 6 days s/p 4/24/25 Procedure:   1.  Left long finger skin grafting with left hand hypothenar full-thickness harvest, cpt 80217  2.  Left long finger irrigation and excisional debridement skin, subcuatenous tissue, cpt 73386  3.  Skin graft substitute application left long finger, cpt 05448.      3 months 13 days s/p Date of Procedure: 4/10/2025   1.  Left long finger open reduction internal fixation intra-articular distal phalanx and middle phalanx fractures, cpt 94546  2.  Left long finger irrigation and excisional debridement skin, subcuatenous tissue and bone, cpt 06824  3.  Skin graft substitute application left long finger, cpt 19201    -Intra-op cx: Methicillin resistant Staphylococcus aureus Oral abx 5/7/25 Linezolid 600mg BID est. w/ ID Dr. Woods    Overall the patient reports doing well.  The patient reports appropriate postoperative soreness with well controlled overall pain.    History of Present Illness    HPI:  Patient presents for follow-up regarding a finger injury to the long finger. He reports significant improvement, stating he is much better. He describes occasional discomfort, characterized as a picking sensation or shock in certain areas when grabbing objects. He admits to still being cautious and avoiding heavy lifting, but has been gradually increasing use of the affected finger. In the past 2-3 weeks, the injured finger has been colder than the others in the morning, requiring manipulation to improve blood flow. The injury has impacted his ability to perform certain activities, with him expressing caution about fishing. He characterizes his recovery as a lengthy process.    WORK  STATUS:  - Patient is cleared for full duty work.         Vitals:    07/23/25 1459   PainSc: 0-No pain   PainLoc: Finger       PE:    AA&O x 4.  NAD  HEENT:  NCAT, sclera nonicteric  Lungs:  Respirations are equal and unlabored.  CV:  2+ bilateral upper and lower extremity pulses.  MSK: Physical Exam    Skin: Right long finger growing. Scar present on radial side of finger at distal phalanx with faint discoloration.  The incision is well healed.  Full wrist and finger motion; able to make composite fist.  Neurovascularly intact and has 5/5 thenar and intrinsic musculature strength.    X-rays AP, lateral and oblique left long finger taken today are independently reviewed by me and shows healing distal phalanx fracture.         Assessment & Plan: Assessment & Plan    FOLLOW UP:  - Follow up in 2 months for another X-ray left long finger.  - Work note provided for full duty clearance.    PATIENT INSTRUCTIONS:  - Gradually start doing heavier activities with the affected finger, being careful not to hit it directly on the tip.  - Test heavier activities first to allow body to adjust to force or weight.  - Use caution when fishing, keeping gloves available.        Miguelina Chicas MD    Please be aware that this note has been generated with the assistance of [email or web address] voice-to-text.  Please excuse any spelling or grammatical errors.  This note was generated with the assistance of ambient listening technology. Verbal consent was obtained by the patient and accompanying visitor(s) for the recording of patient appointment to facilitate this note. I attest to having reviewed and edited the generated note for accuracy, though some syntax or spelling errors may persist. Please contact the author of this note for any clarification.

## 2025-07-23 ENCOUNTER — HOSPITAL ENCOUNTER (OUTPATIENT)
Dept: RADIOLOGY | Facility: OTHER | Age: 35
Discharge: HOME | End: 2025-07-23
Attending: ORTHOPAEDIC SURGERY
Payer: OTHER MISCELLANEOUS

## 2025-07-23 ENCOUNTER — OFFICE VISIT (OUTPATIENT)
Dept: ORTHOPEDICS | Facility: CLINIC | Age: 35
End: 2025-07-23
Payer: COMMERCIAL

## 2025-07-23 DIAGNOSIS — S68.113A TRAUMATIC AMPUTATION OF LEFT MIDDLE FINGER: ICD-10-CM

## 2025-07-23 DIAGNOSIS — Z98.890 POST-OPERATIVE STATE: ICD-10-CM

## 2025-07-23 DIAGNOSIS — Z02.6 ENCOUNTER RELATED TO WORKER'S COMPENSATION CLAIM: Primary | ICD-10-CM

## 2025-07-23 DIAGNOSIS — S62.639B OPEN FRACTURE OF DISTAL PHALANX OF DIGIT OF LEFT HAND: ICD-10-CM

## 2025-07-23 PROCEDURE — 99999 PR PBB SHADOW E&M-EST. PATIENT-LVL III: CPT | Mod: PBBFAC,,, | Performed by: ORTHOPAEDIC SURGERY

## 2025-07-23 PROCEDURE — 73140 X-RAY EXAM OF FINGER(S): CPT | Mod: 26,LT,, | Performed by: RADIOLOGY

## 2025-07-23 PROCEDURE — 73140 X-RAY EXAM OF FINGER(S): CPT | Mod: TC,LT

## 2025-07-24 ENCOUNTER — TELEPHONE (OUTPATIENT)
Facility: CLINIC | Age: 35
End: 2025-07-24
Payer: COMMERCIAL

## 2025-07-24 NOTE — TELEPHONE ENCOUNTER
Copied from CRM #[id number]. Topic: General Inquiry - Patient Advice  >> Jul 24, 2025 11:36 AM Crystal wrote:  Name of Caller: Kajal clay/ [employer] worker's comp      Nature of Call: requesting clinical notes    Best Call Back Number: if needed [phone] ext [phone]     Additional Information: Kajal is needing to get the clinical note from the appt on 07/23/25. Fax to [phone]

## 2025-08-12 ENCOUNTER — PATIENT MESSAGE (OUTPATIENT)
Dept: ORTHOPEDICS | Facility: CLINIC | Age: 35
End: 2025-08-12
Payer: COMMERCIAL

## 2025-08-18 ENCOUNTER — TELEPHONE (OUTPATIENT)
Facility: CLINIC | Age: 35
End: 2025-08-18
Payer: COMMERCIAL

## 2025-09-02 ENCOUNTER — TELEPHONE (OUTPATIENT)
Facility: CLINIC | Age: 35
End: 2025-09-02
Payer: COMMERCIAL

## 2025-09-03 ENCOUNTER — HOSPITAL ENCOUNTER (OUTPATIENT)
Dept: RADIOLOGY | Facility: OTHER | Age: 35
Discharge: HOME | End: 2025-09-03
Attending: ORTHOPAEDIC SURGERY
Payer: OTHER MISCELLANEOUS

## 2025-09-03 ENCOUNTER — OFFICE VISIT (OUTPATIENT)
Dept: ORTHOPEDICS | Facility: CLINIC | Age: 35
End: 2025-09-03
Payer: COMMERCIAL

## 2025-09-03 DIAGNOSIS — S68.113A TRAUMATIC AMPUTATION OF LEFT MIDDLE FINGER: ICD-10-CM

## 2025-09-03 DIAGNOSIS — Z98.890 POST-OPERATIVE STATE: ICD-10-CM

## 2025-09-03 DIAGNOSIS — Z02.6 ENCOUNTER RELATED TO WORKER'S COMPENSATION CLAIM: Primary | ICD-10-CM

## 2025-09-03 DIAGNOSIS — Z98.890 POST-OPERATIVE STATE: Primary | ICD-10-CM

## 2025-09-03 DIAGNOSIS — Z02.6 ENCOUNTER RELATED TO WORKER'S COMPENSATION CLAIM: ICD-10-CM

## 2025-09-03 DIAGNOSIS — S62.639B OPEN FRACTURE OF DISTAL PHALANX OF DIGIT OF LEFT HAND: ICD-10-CM

## 2025-09-03 PROCEDURE — 73140 X-RAY EXAM OF FINGER(S): CPT | Mod: TC,LT

## 2025-09-03 PROCEDURE — 99999 PR PBB SHADOW E&M-EST. PATIENT-LVL III: CPT | Mod: PBBFAC,,, | Performed by: ORTHOPAEDIC SURGERY

## (undated) DEVICE — GLOVE BIOGEL PI MICRO SZ 7

## (undated) DEVICE — NDL MAGELLAN HYPO BLUE 23G 1IN

## (undated) DEVICE — TOWEL OR DISP STRL BLUE 4/PK

## (undated) DEVICE — TOURNIQUET SB QC DP 18X4IN

## (undated) DEVICE — DRESSING N ADH OIL EMUL 3X3

## (undated) DEVICE — SUT 4-0 CHROMIC GUT / RB1

## (undated) DEVICE — COVER CAMERA OPERATING ROOM

## (undated) DEVICE — BLADE SURG #15 CARBON STEEL

## (undated) DEVICE — GAUZE SPONGE 4X4 12PLY

## (undated) DEVICE — PAD CAST SPECIALIST STRL 3

## (undated) DEVICE — MINERAL OIL 10ML MURI LUBE

## (undated) DEVICE — SOL IRR SOD CHL .9% POUR

## (undated) DEVICE — Device

## (undated) DEVICE — SLING ARM LARGE FOAM STRAP

## (undated) DEVICE — BANDAGE COFLEX LF2 TAN 3X5YD

## (undated) DEVICE — FORCEP STRAIGHT DISP

## (undated) DEVICE — TOURNI-COT LARGE

## (undated) DEVICE — CORD BIPOLAR 12 FOOT

## (undated) DEVICE — SUT PROLENE 3-0 FS-2 18

## (undated) DEVICE — COTTONBALL LARGE STRL

## (undated) DEVICE — GOWN ECLIPSE REINF LVL4 TWL XL